# Patient Record
Sex: FEMALE | Race: WHITE | NOT HISPANIC OR LATINO
[De-identification: names, ages, dates, MRNs, and addresses within clinical notes are randomized per-mention and may not be internally consistent; named-entity substitution may affect disease eponyms.]

---

## 2017-02-14 ENCOUNTER — RESULT REVIEW (OUTPATIENT)
Age: 32
End: 2017-02-14

## 2017-04-16 ENCOUNTER — RESULT REVIEW (OUTPATIENT)
Age: 32
End: 2017-04-16

## 2017-04-17 ENCOUNTER — APPOINTMENT (OUTPATIENT)
Dept: HEMATOLOGY ONCOLOGY | Facility: CLINIC | Age: 32
End: 2017-04-17

## 2017-04-17 ENCOUNTER — OUTPATIENT (OUTPATIENT)
Dept: OUTPATIENT SERVICES | Facility: HOSPITAL | Age: 32
LOS: 1 days | Discharge: HOME | End: 2017-04-17

## 2017-04-17 ENCOUNTER — LABORATORY RESULT (OUTPATIENT)
Age: 32
End: 2017-04-17

## 2017-04-17 ENCOUNTER — OUTPATIENT (OUTPATIENT)
Dept: OUTPATIENT SERVICES | Facility: HOSPITAL | Age: 32
LOS: 1 days | End: 2017-04-17
Payer: COMMERCIAL

## 2017-04-17 DIAGNOSIS — D47.3 ESSENTIAL (HEMORRHAGIC) THROMBOCYTHEMIA: ICD-10-CM

## 2017-04-17 DIAGNOSIS — D64.9 ANEMIA, UNSPECIFIED: ICD-10-CM

## 2017-04-17 LAB
HCT VFR BLD CALC: 37.3 % — SIGNIFICANT CHANGE UP (ref 34.5–45)
HGB BLD-MCNC: 12.4 G/DL — SIGNIFICANT CHANGE UP (ref 11.5–15.5)
MCHC RBC-ENTMCNC: 28 PG — SIGNIFICANT CHANGE UP (ref 27–34)
MCHC RBC-ENTMCNC: 33.3 G/DL — SIGNIFICANT CHANGE UP (ref 32–36)
MCV RBC AUTO: 84.2 FL — SIGNIFICANT CHANGE UP (ref 80–100)
PLATELET # BLD AUTO: 394 K/UL — SIGNIFICANT CHANGE UP (ref 150–400)
RBC # BLD: 4.43 M/UL — SIGNIFICANT CHANGE UP (ref 3.8–5.2)
RBC # FLD: 12.7 % — SIGNIFICANT CHANGE UP (ref 10.3–14.5)
WBC # BLD: 8 K/UL — SIGNIFICANT CHANGE UP (ref 3.8–10.5)
WBC # FLD AUTO: 8 K/UL — SIGNIFICANT CHANGE UP (ref 3.8–10.5)

## 2017-04-17 PROCEDURE — 81270 JAK2 GENE: CPT

## 2017-04-17 PROCEDURE — G0452: CPT | Mod: 26

## 2017-04-18 DIAGNOSIS — D47.3 ESSENTIAL (HEMORRHAGIC) THROMBOCYTHEMIA: ICD-10-CM

## 2017-04-19 ENCOUNTER — TRANSCRIPTION ENCOUNTER (OUTPATIENT)
Age: 32
End: 2017-04-19

## 2017-04-19 LAB — JAK2 P.V617F BLD/T QL: SIGNIFICANT CHANGE UP

## 2018-01-18 ENCOUNTER — RESULT REVIEW (OUTPATIENT)
Age: 33
End: 2018-01-18

## 2018-04-09 ENCOUNTER — APPOINTMENT (OUTPATIENT)
Dept: HUMAN REPRODUCTION | Facility: CLINIC | Age: 33
End: 2018-04-09
Payer: COMMERCIAL

## 2018-04-09 PROCEDURE — 99245 OFF/OP CONSLTJ NEW/EST HI 55: CPT | Mod: 25

## 2018-04-09 PROCEDURE — 76830 TRANSVAGINAL US NON-OB: CPT

## 2018-04-09 PROCEDURE — 36415 COLL VENOUS BLD VENIPUNCTURE: CPT

## 2018-04-30 ENCOUNTER — APPOINTMENT (OUTPATIENT)
Dept: HUMAN REPRODUCTION | Facility: CLINIC | Age: 33
End: 2018-04-30
Payer: COMMERCIAL

## 2018-04-30 PROCEDURE — 36415 COLL VENOUS BLD VENIPUNCTURE: CPT

## 2018-04-30 PROCEDURE — 76830 TRANSVAGINAL US NON-OB: CPT

## 2018-04-30 PROCEDURE — 99213 OFFICE O/P EST LOW 20 MIN: CPT | Mod: 25

## 2019-06-26 ENCOUNTER — TRANSCRIPTION ENCOUNTER (OUTPATIENT)
Age: 34
End: 2019-06-26

## 2019-07-02 ENCOUNTER — EMERGENCY (EMERGENCY)
Facility: HOSPITAL | Age: 34
LOS: 1 days | Discharge: HOME | End: 2019-07-02
Attending: EMERGENCY MEDICINE
Payer: COMMERCIAL

## 2019-07-02 VITALS
OXYGEN SATURATION: 100 % | WEIGHT: 227.96 LBS | HEART RATE: 79 BPM | RESPIRATION RATE: 20 BRPM | DIASTOLIC BLOOD PRESSURE: 77 MMHG | HEIGHT: 68 IN | SYSTOLIC BLOOD PRESSURE: 116 MMHG | TEMPERATURE: 98 F

## 2019-07-02 DIAGNOSIS — R10.9 UNSPECIFIED ABDOMINAL PAIN: ICD-10-CM

## 2019-07-02 LAB
ALBUMIN SERPL ELPH-MCNC: 4.1 G/DL — SIGNIFICANT CHANGE UP (ref 3.3–5)
ALP SERPL-CCNC: 75 U/L — SIGNIFICANT CHANGE UP (ref 40–120)
ALT FLD-CCNC: 18 U/L — SIGNIFICANT CHANGE UP (ref 10–45)
ANION GAP SERPL CALC-SCNC: 15 MMOL/L — SIGNIFICANT CHANGE UP (ref 5–17)
APPEARANCE UR: ABNORMAL
AST SERPL-CCNC: 16 U/L — SIGNIFICANT CHANGE UP (ref 10–40)
BACTERIA # UR AUTO: NEGATIVE — SIGNIFICANT CHANGE UP
BASOPHILS # BLD AUTO: 0.1 K/UL — SIGNIFICANT CHANGE UP (ref 0–0.2)
BASOPHILS NFR BLD AUTO: 0.6 % — SIGNIFICANT CHANGE UP (ref 0–2)
BILIRUB SERPL-MCNC: 0.5 MG/DL — SIGNIFICANT CHANGE UP (ref 0.2–1.2)
BILIRUB UR-MCNC: NEGATIVE — SIGNIFICANT CHANGE UP
BLD GP AB SCN SERPL QL: NEGATIVE — SIGNIFICANT CHANGE UP
BUN SERPL-MCNC: 10 MG/DL — SIGNIFICANT CHANGE UP (ref 7–23)
CALCIUM SERPL-MCNC: 9.6 MG/DL — SIGNIFICANT CHANGE UP (ref 8.4–10.5)
CHLORIDE SERPL-SCNC: 101 MMOL/L — SIGNIFICANT CHANGE UP (ref 96–108)
CO2 SERPL-SCNC: 21 MMOL/L — LOW (ref 22–31)
COLOR SPEC: YELLOW — SIGNIFICANT CHANGE UP
CREAT SERPL-MCNC: 0.73 MG/DL — SIGNIFICANT CHANGE UP (ref 0.5–1.3)
DIFF PNL FLD: ABNORMAL
EOSINOPHIL # BLD AUTO: 0.1 K/UL — SIGNIFICANT CHANGE UP (ref 0–0.5)
EOSINOPHIL NFR BLD AUTO: 1.4 % — SIGNIFICANT CHANGE UP (ref 0–6)
EPI CELLS # UR: 9 /HPF — HIGH
GLUCOSE SERPL-MCNC: 89 MG/DL — SIGNIFICANT CHANGE UP (ref 70–99)
GLUCOSE UR QL: NEGATIVE — SIGNIFICANT CHANGE UP
HCG SERPL-ACNC: 216.5 MIU/ML — HIGH
HCT VFR BLD CALC: 38.7 % — SIGNIFICANT CHANGE UP (ref 34.5–45)
HGB BLD-MCNC: 13.4 G/DL — SIGNIFICANT CHANGE UP (ref 11.5–15.5)
HYALINE CASTS # UR AUTO: 0 /LPF — SIGNIFICANT CHANGE UP (ref 0–7)
KETONES UR-MCNC: ABNORMAL
LEUKOCYTE ESTERASE UR-ACNC: NEGATIVE — SIGNIFICANT CHANGE UP
LYMPHOCYTES # BLD AUTO: 2.9 K/UL — SIGNIFICANT CHANGE UP (ref 1–3.3)
LYMPHOCYTES # BLD AUTO: 30.9 % — SIGNIFICANT CHANGE UP (ref 13–44)
MCHC RBC-ENTMCNC: 29.6 PG — SIGNIFICANT CHANGE UP (ref 27–34)
MCHC RBC-ENTMCNC: 34.7 GM/DL — SIGNIFICANT CHANGE UP (ref 32–36)
MCV RBC AUTO: 85.2 FL — SIGNIFICANT CHANGE UP (ref 80–100)
MONOCYTES # BLD AUTO: 0.9 K/UL — SIGNIFICANT CHANGE UP (ref 0–0.9)
MONOCYTES NFR BLD AUTO: 9.3 % — SIGNIFICANT CHANGE UP (ref 2–14)
NEUTROPHILS # BLD AUTO: 5.4 K/UL — SIGNIFICANT CHANGE UP (ref 1.8–7.4)
NEUTROPHILS NFR BLD AUTO: 57.8 % — SIGNIFICANT CHANGE UP (ref 43–77)
NITRITE UR-MCNC: NEGATIVE — SIGNIFICANT CHANGE UP
PH UR: 6 — SIGNIFICANT CHANGE UP (ref 5–8)
PLATELET # BLD AUTO: 392 K/UL — SIGNIFICANT CHANGE UP (ref 150–400)
POTASSIUM SERPL-MCNC: 4.2 MMOL/L — SIGNIFICANT CHANGE UP (ref 3.5–5.3)
POTASSIUM SERPL-SCNC: 4.2 MMOL/L — SIGNIFICANT CHANGE UP (ref 3.5–5.3)
PROT SERPL-MCNC: 7.4 G/DL — SIGNIFICANT CHANGE UP (ref 6–8.3)
PROT UR-MCNC: ABNORMAL
RBC # BLD: 4.55 M/UL — SIGNIFICANT CHANGE UP (ref 3.8–5.2)
RBC # FLD: 12.3 % — SIGNIFICANT CHANGE UP (ref 10.3–14.5)
RBC CASTS # UR COMP ASSIST: 2088 /HPF — HIGH (ref 0–4)
RH IG SCN BLD-IMP: POSITIVE — SIGNIFICANT CHANGE UP
SODIUM SERPL-SCNC: 137 MMOL/L — SIGNIFICANT CHANGE UP (ref 135–145)
SP GR SPEC: 1.02 — SIGNIFICANT CHANGE UP (ref 1.01–1.02)
UROBILINOGEN FLD QL: NEGATIVE — SIGNIFICANT CHANGE UP
WBC # BLD: 9.4 K/UL — SIGNIFICANT CHANGE UP (ref 3.8–10.5)
WBC # FLD AUTO: 9.4 K/UL — SIGNIFICANT CHANGE UP (ref 3.8–10.5)
WBC UR QL: 4 /HPF — SIGNIFICANT CHANGE UP (ref 0–5)

## 2019-07-02 PROCEDURE — 76817 TRANSVAGINAL US OBSTETRIC: CPT | Mod: 26

## 2019-07-02 PROCEDURE — 99218: CPT

## 2019-07-02 PROCEDURE — 93975 VASCULAR STUDY: CPT | Mod: 26

## 2019-07-02 RX ORDER — SODIUM CHLORIDE 9 MG/ML
3 INJECTION INTRAMUSCULAR; INTRAVENOUS; SUBCUTANEOUS EVERY 8 HOURS
Refills: 0 | Status: DISCONTINUED | OUTPATIENT
Start: 2019-07-02 | End: 2019-07-06

## 2019-07-02 RX ORDER — ACETAMINOPHEN 500 MG
650 TABLET ORAL ONCE
Refills: 0 | Status: COMPLETED | OUTPATIENT
Start: 2019-07-02 | End: 2019-07-02

## 2019-07-02 RX ORDER — SODIUM CHLORIDE 9 MG/ML
1000 INJECTION, SOLUTION INTRAVENOUS
Refills: 0 | Status: DISCONTINUED | OUTPATIENT
Start: 2019-07-02 | End: 2019-07-06

## 2019-07-02 RX ORDER — FAMOTIDINE 10 MG/ML
20 INJECTION INTRAVENOUS DAILY
Refills: 0 | Status: DISCONTINUED | OUTPATIENT
Start: 2019-07-02 | End: 2019-07-06

## 2019-07-02 RX ORDER — KETOROLAC TROMETHAMINE 30 MG/ML
15 SYRINGE (ML) INJECTION ONCE
Refills: 0 | Status: DISCONTINUED | OUTPATIENT
Start: 2019-07-02 | End: 2019-07-02

## 2019-07-02 RX ADMIN — Medication 15 MILLIGRAM(S): at 21:32

## 2019-07-02 RX ADMIN — FAMOTIDINE 20 MILLIGRAM(S): 10 INJECTION INTRAVENOUS at 18:45

## 2019-07-02 RX ADMIN — Medication 650 MILLIGRAM(S): at 16:58

## 2019-07-02 RX ADMIN — SODIUM CHLORIDE 3 MILLILITER(S): 9 INJECTION INTRAMUSCULAR; INTRAVENOUS; SUBCUTANEOUS at 22:53

## 2019-07-02 RX ADMIN — SODIUM CHLORIDE 100 MILLILITER(S): 9 INJECTION, SOLUTION INTRAVENOUS at 20:56

## 2019-07-02 RX ADMIN — Medication 15 MILLIGRAM(S): at 20:56

## 2019-07-02 NOTE — ED PROVIDER NOTE - PROGRESS NOTE DETAILS
gyn evaluated pt review ultrasound would like to place in obs for repeat h/h and re-eval in the am; tp currently stable

## 2019-07-02 NOTE — ED PROVIDER NOTE - CLINICAL SUMMARY MEDICAL DECISION MAKING FREE TEXT BOX
30F, multiple miscarriages and ectopics, p.w LLQ pain and vaginal bld today after methotrexate last night. VSS, no fever, dysuria, hematuria. concerning for ruptured ectopic, will get labs, type and screeen, tvus, and basic labs. symptoms can be side effects of methotrexate.

## 2019-07-02 NOTE — ED CDU PROVIDER INITIAL DAY NOTE - OBJECTIVE STATEMENT
34F,  (2 ectop, 3miscarriage), h.o right salpingectomy, ovarian cyst, p.w LLQ and epigastric pain and vaginal spotting (1pad today) this AM s/p methotrexate last night for ectopic pregn, LMS 19. Pt states pain is sharp and radiates to the back. GYN Caffaro. Patient was being followed in the office for pregnancy of unknown location and had the following b-hcg trend: 102 ()->169 ()->118 ()-> 203 ()->321 (). No IUP on sono and no adnexal masses. She reports no other complaints since receiving the MTX.  In ED, patient had H/H 13.4/38.7, Beta .5 and US transvaginal showing Left 1.4 cm adnexal ectopic pregnancy.  Small hemoperitoneum. Patient evaluated by GYN and sent to CDU for frequent reeval, repeat labs (H/H, beta HCG) and pain control 34F,  (2 ectop, 3miscarriage), h.o right salpingectomy, ovarian cyst, p.w LLQ and epigastric pain and vaginal spotting (1pad today) this AM s/p methotrexate last night for ectopic pregn, LMS 19. Pt states pain is sharp and radiates to the back. GYN Caffaro. Patient was being followed in the office for pregnancy of unknown location and had the following b-hcg trend: 102 ()->169 ()->118 ()-> 203 ()->321 (). No IUP on sono and no adnexal masses. She reports no other complaints since receiving the MTX.  In ED, patient had H/H 13.4/38.7, Beta .5 and US transvaginal showing Left 1.4 cm adnexal ectopic pregnancy. Small hemoperitoneum. Patient evaluated by GYN and sent to CDU for frequent reeval, repeat labs (H/H, beta HCG) and pain control.

## 2019-07-02 NOTE — ED ADULT NURSE NOTE - NSIMPLEMENTINTERV_GEN_ALL_ED
Implemented All Universal Safety Interventions:  [place] to call system. Call bell, personal items and telephone within reach. Instruct patient to call for assistance. Room bathroom lighting operational. Non-slip footwear when patient is off stretcher. Physically safe environment: no spills, clutter or unnecessary equipment. Stretcher in lowest position, wheels locked, appropriate side rails in place.

## 2019-07-02 NOTE — CONSULT NOTE ADULT - SUBJECTIVE AND OBJECTIVE BOX
GYNECOLOGIC CONSULT NOTE    34y  LMP , status post MTX 19 for suspected ectopic pregnancy, presents to ED c/o abdominal pain x1 day. Pain is sharp, located in the mid-line, radiates to the back. No alleviating or exacerbating factors. Denies associated n/v. Patient was being followed in the office for pregnancy of unknown location and had the following b-hcg trend: 102 ()->169 ()->118 ()-> 203 ()->321 (). No IUP on sono and no adnexal masses. She reports no other complaints since receiving the MTX. Denies fever/chills, hives/pruritus, vaginal bleeding, pelvic pain.     GYN Physician: Dr. Gardner    OB/GYN History: 1x  2016, 2x ectopic pregnancies status post MTX, SAB x2, h/o ovarian cysts status post laparoscopic cystectomy x2 (left and right ovaries), blocked fallopian tube status post laparoscopic salpingectomy; denies fibroids    Surgical History:    H/o multiple laparoscopies  Right salpingectomy  Ovarian cystectomy x1  HSG    Past Medical History:   Migraines     Meds:   Propanolol     All:   NKDA    Family History:   No significant history    Social History:   Denies alcohol/drug/tobacco use    REVIEW OF SYSTEMS:    CONSTITUTIONAL: No fever, weight loss, or fatigue  RESPIRATORY: No cough, wheezing, chills or hemoptysis; No shortness of breath  CARDIOVASCULAR: No chest pain, palpitations, dizziness  GASTROINTESTINAL: +abdominal pain; No nausea, vomiting; No diarrhea or constipation.  GENITOURINARY: No dysuria, frequency, hematuria, or incontinence  MUSCULOSKELETAL: No joint pain or swelling  PELVIS: No vaginal bleeding    MEDICATIONS  (STANDING):  famotidine    Tablet 20 milliGRAM(s) Oral daily    MEDICATIONS  (PRN):    OBJECTIVE FINDINGS:    Vital Signs Last 24 Hrs  T(C): 37.2 (2019 14:30), Max: 37.2 (2019 14:30)  T(F): 99 (2019 14:30), Max: 99 (2019 14:30)  HR: 81 (2019 14:30) (79 - 81)  BP: 118/78 (2019 14:30) (116/77 - 118/78)  BP(mean): --  RR: 18 (2019 14:30) (18 - 20)  SpO2: 100% (2019 14:30) (100% - 100%)    PHYSICAL EXAM:    GENERAL: NAD  NERVOUS SYSTEM:  Alert & Oriented X3, Good concentration  CHEST/LUNG: CTAB  HEART: Regular rate and rhythm  ABDOMEN: Soft, Nontender, Nondistended; No rebound, No guarding  PELVIC: Deferred    LABS:  Pending    RADIOLOGY & ADDITIONAL STUDIES:  Pending

## 2019-07-02 NOTE — CONSULT NOTE ADULT - ATTENDING COMMENTS
PT EVALUATED EARLIER TODAY & AGAIN AFTER TESTING. ABD TOTALLY BENIGN. H/H=13.4/38.7. HCG=216 [ 7/1 BEFORE MTX]. LEFT ECTOPIC 1.4X0.9 CM. SMALL AMT HEMOPERITONEUM. PT APPEARS TO BE ABORTING ECTOPIC. LOOKED AT TVS & SPOKE WITH RADIOLOGY. NOT RUPTURED. WILL WATCH OVERNIGHT. RPT CBC IN AM.WITH HCG. IF STABLE WILL D/C IN AM & FOLLOW CONSERVATIVELY. THIS WAS DISCUSSED AT LENGTH WITH PT &

## 2019-07-02 NOTE — CONSULT NOTE ADULT - ASSESSMENT
34y  LMP , status post MTX 19 for suspected ectopic pregnancy, presents w/ abdominal pain x1 day. Physical exam benign. VS stable. Low suspicion for ruptured ectopic. Suspect abdominal pain is likely 2/2 MTX administration, which is a possible side effect of the medication (causes inflammation of GI tract).

## 2019-07-02 NOTE — ED CDU PROVIDER INITIAL DAY NOTE - PROGRESS NOTE DETAILS
CDU PROGRESS NOTE PA JUANITO: Pt c/o 5/10 discomfort LLQ. Abdominal exam +ttp LLQ, + BS x4 with soft, nondistended, abdomen. Will order Toradol 15mg IVP and continue to monitor overnight. CDU PROGRESS NOTE PA JUANITO: Pt resting comfortably, without complaint. NAD, VSS. Will continue to monitor. CDU PROGRESS NOTE PA JUANITO: Pt c/o 5/10 discomfort LLQ and lower back. Abdominal exam +ttp LLQ, + BS x4, soft, no guarding, CVAT or rebound. Will order Toradol 15mg IVP and continue to monitor overnight.

## 2019-07-02 NOTE — ED ADULT NURSE NOTE - CHPI ED NUR SYMPTOMS NEG
no burning urination/no chills/no dysuria/no fever/no diarrhea/no abdominal distension/no blood in stool/no hematuria/no nausea/no vomiting

## 2019-07-02 NOTE — ED CDU PROVIDER INITIAL DAY NOTE - ATTENDING CONTRIBUTION TO CARE
I, Shannan Garcia, performed the initial face to face bedside interview with this patient regarding history of present illness, review of symptoms and relevant past medical, social and family history.  I completed an independent physical examination.  I was the initial provider who evaluated this patient. I have signed out the follow up of any pending tests (i.e. labs, radiological studies) to the ACP.  I have communicated the patient’s plan of care and disposition with the ACP.

## 2019-07-02 NOTE — ED CDU PROVIDER INITIAL DAY NOTE - MEDICAL DECISION MAKING DETAILS
pt sp methotrexate pw abd pain; + left adnexal ectopic with mild free fluid in abd--gyn would like pt in obs for h.h check and reeval in the am; pt appears stable at this time

## 2019-07-02 NOTE — ED ADULT TRIAGE NOTE - CHIEF COMPLAINT QUOTE
pt c/o LLQ pain that radiates to the back s/p taking methotrexate for ectopic pregnancy yesterday.  pt denies any bleeding at this time.

## 2019-07-02 NOTE — ED CDU PROVIDER INITIAL DAY NOTE - DETAILS
ABDOMINAL PAIN  -PAIN CONTROL  -SERIAL ABDOMINAL EXAMS  -JACKI MOREL, REPEAT LABS  -CASE D/W ATTENDING HENRIQUE

## 2019-07-02 NOTE — ED CDU PROVIDER INITIAL DAY NOTE - PHYSICAL EXAMINATION
General: NAD, good hygiene, well developed  HENT: Atraumatic, EMOI, no conjunctivae injection, moist mucosa, no post. oropharynx erythema, exudates  Neck: normal ROM and trachea midline   Cardiovascular: RRR, S1&2, no M or R, radial pulses equal and b/l  Respiratory: CTABL, no wheezes or crackles, no decreased breath sounds  Abdominal: soft and epigastric tenderness, distended, neg for guarding, joy's sign, rovsing's sign, mcburney's sign, no CVA tenderness   Extremities: no edema of the legs/feet, DP/PT equal b/l  Skin: warm, well perfused  Neurologic: nonfocal, AAOx3  Psych: normal mood and affect General: NAD, good hygiene, well developed  HENT: Atraumatic, EMOI, no conjunctivae injection, moist mucosa, no post. oropharynx erythema, exudates  Neck: normal ROM and trachea midline   Cardiovascular: RRR, S1&2, no M or R, radial pulses equal and b/l  Respiratory: CTABL, no wheezes or crackles, no decreased breath sounds  Abdominal: +bowel sounds x 4, soft, +LLQ ttp neg for guarding, joy's sign, rovsing's sign, mcburney's sign, no CVA tenderness   Extremities: no edema of the legs/feet, DP/PT equal b/l  Skin: warm, well perfused  Neurologic: nonfocal, AAOx3  Psych: normal mood and affect

## 2019-07-02 NOTE — ED PROVIDER NOTE - PHYSICAL EXAMINATION
General: NAD, good hygiene, well developed  HENT: Atraumatic, EMOI, no conjunctivae injection, moist mucosa, no post. oropharynx erythema, exudates  Neck: normal ROM and trachea midline   Cardiovascular: RRR, S1&2, no M or R, radial pulses equal and b/l  Respiratory: CTABL, no wheezes or crackles, no decreased breath sounds  Abdominal: soft and epigastric tenderness, distended, neg for guarding, joy's sign, rovsing's sign, mcburney's sign, no CVA tenderness   Extremities: no edema of the legs/feet, DP/PT equal b/l  Skin: warm, well perfused  Neurologic: nonfocal, AAOx3  Psych: normal mood and affect

## 2019-07-02 NOTE — ED CDU PROVIDER INITIAL DAY NOTE - NS ED ROS FT
GENERAL: No fever or chills, weight changes, night sweats  EYES: no change in vision  HEENT: no dysplasia, odynophagia, ear pain, rhinorrhea, epistasis   CARDIAC: no chest pain, palpitation   PULMONARY: no cough or SOB  GI: (+) LLQ and epigastic abdominal pain, no nausea or no vomiting, no diarrhea or constipation  : No changes in urination for pain/freq.   SKIN: no rashes   NEURO: no headache, numbness/tingling, extremity weakness   MSK: No joint pain GENERAL: No fever or chills, weight changes, night sweats  EYES: no change in vision  HEENT: no dysplasia, odynophagia, ear pain, rhinorrhea, epistasis   CARDIAC: no chest pain, palpitation   PULMONARY: no cough or SOB  GI: (+) LLQ and lower back, no nausea or no vomiting, no diarrhea or constipation  : No changes in urination for pain/freq.   SKIN: no rashes   NEURO: no headache, numbness/tingling, extremity weakness   MSK: No joint pain

## 2019-07-02 NOTE — ED PROVIDER NOTE - OBJECTIVE STATEMENT
34F,  (2 ectop, 3miscarriage), h.o right salpingectomy, ovarian cyst, p.w LLQ and epigastric pain and vaginal spotting (1pad today) this AM s/p methotrexate last night for ectopic pregn, LMS 19. Pt was evaluated at U/S without IUP with elevated HCG past discriminatory level. Pt states pain is sharp and radiates to the back. GYN Caffaro. No h.o of STI/PID.  Denied fever, chill, diarrhea, vomiting, hematuria or dysuria.

## 2019-07-02 NOTE — ED ADULT NURSE NOTE - OBJECTIVE STATEMENT
33 y/o female  hx ectopic pregnancy x 2, miscarriage x 3, ovarian cyst presents to the ED from home c/o LLQ abd pain x 2 days. Pt states was 8 weeks pregnant, diagnosed with ectopic pregnancy, given methotrexate last night still having cramping in LLQ radiating to flank, vaginal bleeding, using 1 pad a day. Denies fever, chills, n/v, weakness, diarrhea/constipation, numbness/tingling, urinary s/s. Pt A&Ox3, in no respiratory distress, no CP, abd soft, tender to palpation, nondistended. PT safety maintained with family at bedside, call bell within reach and bed in the lowest position.

## 2019-07-02 NOTE — ED PROVIDER NOTE - ATTENDING CONTRIBUTION TO CARE
Dr. Garcia : I have personally seen and examined this patient at the bedside. I have fully participated in the care of this patient. I have reviewed all pertinent clinical information, including history, physical exam, plan and the Resident's note and agree except as noted.     34F,  (2 ectop, 3miscarriage), h.o right salpingectomy, ovarian cyst, cholecystectomy p.w LLQ and epigastric pain and vaginal spotting (1pad today) since this morning pt is s/p methotrexate last night for ectopic pregn. , LMP 19. notes no lower abd pain at this time all epigastric now. +nausea. spotting in the morning now bleeding brbpv.    Denies f/c//v/cp/sob/palpitations/cough//d/c/dysuria/hematuria. no sick contacts/recent travel.    PE:  head; atraumatic normocephalic  eyes: perrla  Heart: rrr s1s2  lungs: ctab  abd: soft, mild epigastric discomfort neg murphys neg mc burneys nd + bs no rebound/guarding no cva ttp  le: no swelling no calf ttp  back: no midline cervical/thoracic/lumbar ttp    -->gastritis vs pancreatitis vs side effects from methotrexate will fu labs; sono; gyn consulted

## 2019-07-02 NOTE — CONSULT NOTE ADULT - PROBLEM SELECTOR RECOMMENDATION 9
-F/u labs (b-hcg, CBC, CMP, lipase, T&S)  -F/u repeat TVUS   -Monitor VS   -Final reccs pending lab and imaging results     Patient seen and examined w/ Dr. Kendra Woods PGY-2

## 2019-07-02 NOTE — ED CLERICAL - NS ED CLERK NOTE PRE-ARRIVAL INFORMATION; ADDITIONAL PRE-ARRIVAL INFORMATION
CC/Reason For referral: sent to r/o ruptured ectopic  Preferred Consultant(if applicable): gyn  Who admits for you (if needed):  Do you have documents you would like to fax over?no  Would you still like to speak to an ED attending?no

## 2019-07-03 VITALS
TEMPERATURE: 98 F | RESPIRATION RATE: 18 BRPM | SYSTOLIC BLOOD PRESSURE: 117 MMHG | OXYGEN SATURATION: 98 % | HEART RATE: 72 BPM | DIASTOLIC BLOOD PRESSURE: 78 MMHG

## 2019-07-03 LAB
BASOPHILS # BLD AUTO: 0 K/UL — SIGNIFICANT CHANGE UP (ref 0–0.2)
BASOPHILS NFR BLD AUTO: 0.6 % — SIGNIFICANT CHANGE UP (ref 0–2)
EOSINOPHIL # BLD AUTO: 0.2 K/UL — SIGNIFICANT CHANGE UP (ref 0–0.5)
EOSINOPHIL NFR BLD AUTO: 2.7 % — SIGNIFICANT CHANGE UP (ref 0–6)
HCG SERPL-ACNC: 122.7 MIU/ML — HIGH
HCT VFR BLD CALC: 36.3 % — SIGNIFICANT CHANGE UP (ref 34.5–45)
HGB BLD-MCNC: 12.7 G/DL — SIGNIFICANT CHANGE UP (ref 11.5–15.5)
LYMPHOCYTES # BLD AUTO: 3.1 K/UL — SIGNIFICANT CHANGE UP (ref 1–3.3)
LYMPHOCYTES # BLD AUTO: 42.5 % — SIGNIFICANT CHANGE UP (ref 13–44)
MCHC RBC-ENTMCNC: 29.6 PG — SIGNIFICANT CHANGE UP (ref 27–34)
MCHC RBC-ENTMCNC: 35 GM/DL — SIGNIFICANT CHANGE UP (ref 32–36)
MCV RBC AUTO: 84.5 FL — SIGNIFICANT CHANGE UP (ref 80–100)
MONOCYTES # BLD AUTO: 0.8 K/UL — SIGNIFICANT CHANGE UP (ref 0–0.9)
MONOCYTES NFR BLD AUTO: 10.2 % — SIGNIFICANT CHANGE UP (ref 2–14)
NEUTROPHILS # BLD AUTO: 3.2 K/UL — SIGNIFICANT CHANGE UP (ref 1.8–7.4)
NEUTROPHILS NFR BLD AUTO: 43.9 % — SIGNIFICANT CHANGE UP (ref 43–77)
PLATELET # BLD AUTO: 362 K/UL — SIGNIFICANT CHANGE UP (ref 150–400)
RBC # BLD: 4.3 M/UL — SIGNIFICANT CHANGE UP (ref 3.8–5.2)
RBC # FLD: 12 % — SIGNIFICANT CHANGE UP (ref 10.3–14.5)
WBC # BLD: 7.3 K/UL — SIGNIFICANT CHANGE UP (ref 3.8–10.5)
WBC # FLD AUTO: 7.3 K/UL — SIGNIFICANT CHANGE UP (ref 3.8–10.5)

## 2019-07-03 PROCEDURE — 84702 CHORIONIC GONADOTROPIN TEST: CPT

## 2019-07-03 PROCEDURE — 81001 URINALYSIS AUTO W/SCOPE: CPT

## 2019-07-03 PROCEDURE — 86900 BLOOD TYPING SEROLOGIC ABO: CPT

## 2019-07-03 PROCEDURE — 99284 EMERGENCY DEPT VISIT MOD MDM: CPT | Mod: 25

## 2019-07-03 PROCEDURE — 80053 COMPREHEN METABOLIC PANEL: CPT

## 2019-07-03 PROCEDURE — 86850 RBC ANTIBODY SCREEN: CPT

## 2019-07-03 PROCEDURE — G0378: CPT

## 2019-07-03 PROCEDURE — 96376 TX/PRO/DX INJ SAME DRUG ADON: CPT

## 2019-07-03 PROCEDURE — 85027 COMPLETE CBC AUTOMATED: CPT

## 2019-07-03 PROCEDURE — 83690 ASSAY OF LIPASE: CPT

## 2019-07-03 PROCEDURE — 93975 VASCULAR STUDY: CPT

## 2019-07-03 PROCEDURE — 99217: CPT

## 2019-07-03 PROCEDURE — 86901 BLOOD TYPING SEROLOGIC RH(D): CPT

## 2019-07-03 PROCEDURE — 96374 THER/PROPH/DIAG INJ IV PUSH: CPT

## 2019-07-03 PROCEDURE — 76817 TRANSVAGINAL US OBSTETRIC: CPT

## 2019-07-03 RX ORDER — KETOROLAC TROMETHAMINE 30 MG/ML
15 SYRINGE (ML) INJECTION ONCE
Refills: 0 | Status: DISCONTINUED | OUTPATIENT
Start: 2019-07-03 | End: 2019-07-03

## 2019-07-03 RX ADMIN — SODIUM CHLORIDE 3 MILLILITER(S): 9 INJECTION INTRAMUSCULAR; INTRAVENOUS; SUBCUTANEOUS at 05:54

## 2019-07-03 RX ADMIN — Medication 15 MILLIGRAM(S): at 05:53

## 2019-07-03 NOTE — ED CDU PROVIDER DISPOSITION NOTE - NSFOLLOWUPINSTRUCTIONS_ED_ALL_ED_FT
1. Stay hydrated. Rest.   2. Take Ibuprofen 600mg every 6hrs for pain as needed with food.   3. Follow up with your Ob/gyn Dr. Gardner at your appointment in 2 days. Bring Printed Results.  4. Return if symptoms worsen, fever, weakness, inability to eat/drink, dizziness, worsening pain, bleeding and all other concerns.

## 2019-07-03 NOTE — ED CDU PROVIDER SUBSEQUENT DAY NOTE - PHYSICAL EXAMINATION
General: NAD, good hygiene, well developed  HENT: Atraumatic, EMOI, no conjunctivae injection, moist mucosa, no post. oropharynx erythema, exudates  Neck: normal ROM and trachea midline   Cardiovascular: RRR, S1&2, no M or R, radial pulses equal and b/l  Respiratory: CTABL, no wheezes or crackles, no decreased breath sounds  Abdominal: soft, distended, no ttp, neg for guarding, joy's sign, rovsing's sign, mcburney's sign, no CVA tenderness   Extremities: no edema of the legs/feet, DP/PT equal b/l  Skin: warm, well perfused  Neurologic: nonfocal, AAOx3  Psych: normal mood and affect General: NAD, good hygiene, well developed  HENT: Atraumatic, EMOI, no conjunctivae injection, moist mucosa, no post. oropharynx erythema, exudates  Neck: normal ROM and trachea midline   Cardiovascular: RRR, S1&2, no M or R, radial pulses equal and b/l  Respiratory: CTABL, no wheezes or crackles, no decreased breath sounds  Abdominal: soft, + ttp LLQ, neg for guarding, joy's sign, rovsing's sign, mcburney's sign, no CVA tenderness   Extremities: no edema of the legs/feet, DP/PT equal b/l  Skin: warm, well perfused  Neurologic: nonfocal, AAOx3  Psych: normal mood and affect

## 2019-07-03 NOTE — ED ADULT NURSE REASSESSMENT NOTE - NSIMPLEMENTINTERV_GEN_ALL_ED
Implemented All Universal Safety Interventions:  [place] to call system. Call bell, personal items and telephone within reach. Instruct patient to call for assistance. Room bathroom lighting operational. Non-slip footwear when patient is off stretcher. Physically safe environment: no spills, clutter or unnecessary equipment. Stretcher in lowest position, wheels locked, appropriate side rails in place.

## 2019-07-03 NOTE — ED CDU PROVIDER DISPOSITION NOTE - CLINICAL COURSE
34F,  (2 ectop, 3miscarriage), h.o right salpingectomy, ovarian cyst, p.w LLQ and epigastric pain and vaginal spotting (1pad today) this AM s/p methotrexate last night for ectopic pregn, LMS 19. Pt states pain is sharp and radiates to the back. GYN Caffaro. Patient was being followed in the office for pregnancy of unknown location and had the following b-hcg trend: 102 ()->169 ()->118 ()-> 203 ()->321 (). No IUP on sono and no adnexal masses. She reports no other complaints since receiving the MTX.  In ED, patient had H/H 13.4/38.7, Beta .5 and US transvaginal showing Left 1.4 cm adnexal ectopic pregnancy. Small hemoperitoneum. Patient evaluated by GYN and sent to CDU for frequent reeval, repeat labs (H/H, beta HCG) and pain control. 34F,  (2 ectop, 3miscarriage), h.o right salpingectomy, ovarian cyst, p.w LLQ and epigastric pain and vaginal spotting (1pad today) this AM s/p methotrexate last night for ectopic pregn, LMS 19. Pt states pain is sharp and radiates to the back. GYN Caffaro. Patient was being followed in the office for pregnancy of unknown location and had the following b-hcg trend: 102 ()->169 ()->118 ()-> 203 ()->321 (). No IUP on sono and no adnexal masses. She reports no other complaints since receiving the MTX.  In ED, patient had H/H 13.4/38.7, Beta .5 and US transvaginal showing Left 1.4 cm adnexal ectopic pregnancy. Small hemoperitoneum. Patient evaluated by GYN and sent to CDU for frequent reeval, repeat labs (H/H, beta HCG) and pain control. pt's repeat labs show improved HCG. pt without pain by morning. pt reevaluated by OB/GYN team, ok to d/c home.

## 2019-07-03 NOTE — ED CDU PROVIDER SUBSEQUENT DAY NOTE - PROGRESS NOTE DETAILS
CDU PROGRESS NOTE PA JUANITO: Pt resting in stretcher in NAD. Patient c/o 5/10 pain to LLQ. Abdomen: soft, distended, +ttp LLQ neg for guarding, joy's sign, rovsing's sign, mcburney's sign, no CVA tenderness. Will order Toradol 15mg IV and monitor. Repeat CBC and beta HCG pending. GYN following. CDU PROGRESS NOTE PA JUANITO: Pt resting in stretcher in NAD. Patient c/o 5/10 pain to LLQ and lower back. Abdomen: soft, +ttp LLQ neg for guarding, joy's sign, rovsing's sign, mcburney's sign, no CVA tenderness. Will order Toradol 15mg IV and monitor. Repeat CBC and beta HCG pending. GYN following. CDU PROGRESS NOTE JYOTHI SOLOMON: Patient states she is feeling better and pain has subsided. Repeat Beta hcg trending down from 216.5 to 122.7, H/H stable. Patient evaluated by GYN team, and state patient stable for d/c and may follow up with her GYN, Dr. Gardner this Friday for repeat Beta Hcg. CDU NOTE JYOTHI Brennan: pt resting comfortably, feels well without complaint. no pain. NAD recent VSS. abd- soft, NT/ND. as per OB/GYN team, pt stable for d/c home. Patient re-evaluated at bedside. No acute complaints. Reports feeling much better, aware of results. States she will follow up with GYN. Seen by GYN.  PE: NAD, VSS, Abdomen soft, non-tender, non-distended, no rebound, no guarding

## 2019-07-03 NOTE — ED ADULT NURSE REASSESSMENT NOTE - GENERAL PATIENT STATE
comfortable appearance/cooperative
comfortable appearance
smiling/interactive/comfortable appearance/cooperative

## 2019-07-03 NOTE — ED CDU PROVIDER SUBSEQUENT DAY NOTE - ATTENDING CONTRIBUTION TO CARE
Ectopic pregnancy, s/p methotrexate, evaluated by GYN, rec. obs, bHCG downtrended, cleared by GYN to be discharged. Patient stable for discharge, no acute complaints,.

## 2019-07-03 NOTE — ED CDU PROVIDER DISPOSITION NOTE - PLAN OF CARE
As recommended by OB/GYN.............  Follow up with your Primary Care Physician within the next 2-3 days  Bring a copy of your test results with you to your appointment  Return to the Emergency Room if you experience new or worsening symptoms

## 2019-07-03 NOTE — PROGRESS NOTE ADULT - ASSESSMENT
A/P: 34y presenting w/ abdominal pain s/p MTX 7/1/19, likely from an aborting ectopic pregnancy through the fallopian tubes.  Patient is stable and doing well.  Morning labs have returned and were discussed with Dr. Gardner.  Patient will be seen in the office on 7/5 for day 4 bhCG follow up.  Plan discussed with the CDU team. 34y p/w abdominal pain s/p MTX 7/1/19, likely from an aborting ectopic pregnancy through the fallopian tubes.  Patient is stable and doing well, now without abdominal pain.  AM H/H stable and bHCG downtrending.

## 2019-07-03 NOTE — ED ADULT NURSE REASSESSMENT NOTE - NS ED NURSE REASSESS COMMENT FT1
10.00 Am Pt is reviewed by CDU Md Trung Murphy with all the results. pt feeling better . Pt discharged Ml out JYOTHI Brennan explained the follow up care & gave the discharge summary.  Pt verbalized the understanding on follow up care. Pt has stable vitals steady gait A&OX 4 pain free Stacie N/V/D afebrile at the time of discharge
Pt transported to US
cdu PA examining pt
Pt received from TERESO Muniz. Pt oriented to CDU & plan of care was discussed. Pt endorses 5/10 LLQ and lower back pain. To be medicated as per MAR. Pt denies any excessive bleeding at the moment and states she only notices blood when whipping. Pt aware to notify RN or PA of any increase in bleeding and severe abdominal pain. Safety & comfort measures maintained. Call bell in reach. Will continue to monitor.
07.00 Am Pt received from TERESO Randall pt is observed for  Ectopic pregnancy with abdominal pain    08.00 Am  Pt is  reassessed Pt is A&OX 4 Pt is oriented to CDU & plan of care was discussed.  Pt denies CP  SOB  N/V/D fever chills has stable vitals   Safety & comfort measures maintained. IV site looks clean & dry  no signs of infiltration noted pt denies pain @ IV site  . Pt advised to call for help if needed Call bell  with in  the  reach. Pt verbalized the understanding  Will continue to monitor.

## 2019-07-03 NOTE — PROGRESS NOTE ADULT - SUBJECTIVE AND OBJECTIVE BOX
R1 OBGYN Progress Note        Patient seen and examined at bedside.  No acute events overnight.  No acute complaints.  Pain well controlled. Patient is ambulating and tolerating PO intake. Patient is passing flatus.  Patient is voiding spontaneously.  Denies CP, SOB, N/V, fevers, and chills.    Vital Signs Last 24 Hours  T(C): 36.8 (07-03-19 @ 04:08), Max: 37.2 (07-02-19 @ 14:30)  HR: 77 (07-03-19 @ 04:08) (67 - 81)  BP: 108/70 (07-03-19 @ 04:08) (94/61 - 118/78)  RR: 18 (07-03-19 @ 04:08) (18 - 20)  SpO2: 98% (07-03-19 @ 04:08) (98% - 100%)      Physical Exam:  General: NAD  CV: RR, S1, S2  Lungs: CTA b/l, good air flow b/l   Abdomen: Soft, mildly-tender to palpation diffusely, normoactive bowel sounds  : light vaginal bleeding  Ext: No pain or swelling     Labs:                        12.7   7.3   )-----------( 362      ( 03 Jul 2019 05:46 )             36.3   baso 0.6    eos 2.7    imm gran x      lymph 42.5   mono 10.2   poly 43.9                         13.4   9.4   )-----------( 392      ( 02 Jul 2019 17:21 )             38.7   baso 0.6    eos 1.4    imm gran x      lymph 30.9   mono 9.3    poly 57.8       MEDICATIONS  (STANDING):  dextrose 5% + sodium chloride 0.45%. 1000 milliLiter(s) (100 mL/Hr) IV Continuous <Continuous>  famotidine    Tablet 20 milliGRAM(s) Oral daily  sodium chloride 0.9% lock flush 3 milliLiter(s) IV Push every 8 hours    Neuro: Pain well controlled, continue on PO pain medications  CV: hemodynamically stable, monitor vitals  Pulm: saturating well on room air, encourage oob/amb  GI: tolerating regular diet, SLIV  : voiding spontaneously, urine output adequate  Heme: SCDs for DVT ppx, f/u AM H&H  ID: afebrile  Dispo: continue routine post-op care    Talia King PGY-1 R1 OBGYN Progress Note    HD#2    SUBJECTIVE:    Patient seen and examined at bedside.  No acute events overnight.  No acute complaints.  Pt denies pain. Patient is ambulating and tolerating PO intake. Patient is passing flatus.  Patient is voiding spontaneously.  Denies CP, SOB, N/V, fevers, and chills.    OBJECTIVE:  Vital Signs Last 24 Hours  T(C): 36.8 (07-03-19 @ 04:08), Max: 37.2 (07-02-19 @ 14:30)  HR: 77 (07-03-19 @ 04:08) (67 - 81)  BP: 108/70 (07-03-19 @ 04:08) (94/61 - 118/78)  RR: 18 (07-03-19 @ 04:08) (18 - 20)  SpO2: 98% (07-03-19 @ 04:08) (98% - 100%)      Physical Exam:  General: NAD  CV: RR, S1, S2  Lungs: CTA b/l, good air flow b/l   Abdomen: Soft, nontender to palpation, normoactive bowel sounds  : light vaginal bleeding  Ext: wwp, nontender    Labs:                        12.7   7.3   )-----------( 362      ( 03 Jul 2019 05:46 )             36.3   baso 0.6    eos 2.7    imm gran x      lymph 42.5   mono 10.2   poly 43.9                         13.4   9.4   )-----------( 392      ( 02 Jul 2019 17:21 )             38.7   baso 0.6    eos 1.4    imm gran x      lymph 30.9   mono 9.3    poly 57.8       MEDICATIONS  (STANDING):  dextrose 5% + sodium chloride 0.45%. 1000 milliLiter(s) (100 mL/Hr) IV Continuous <Continuous>  famotidine    Tablet 20 milliGRAM(s) Oral daily  sodium chloride 0.9% lock flush 3 milliLiter(s) IV Push every 8 hours

## 2019-07-03 NOTE — PROGRESS NOTE ADULT - PROBLEM SELECTOR PLAN 1
Neuro: Pain well controlled, continue on PO pain medications  CV: hemodynamically stable, monitor vitals  Pulm: saturating well on room air, encourage oob/amb  GI: tolerating regular diet  : voiding spontaneously  Heme: SCDs for DVT ppx, f/u AM H&H  ID: afebrile  Dispo: d/c home w/ outpatient f/u on 7/5    aTlia King PGY-1 Neuro: Pain well controlled, continue on PO pain medications  CV: hemodynamically stable, monitor vitals  Pulm: saturating well on room air, encourage oob/amb  GI: tolerating regular diet  : voiding spontaneously  Heme: SCDs for DVT ppx, f/u AM H&H  ID: afebrile  Dispo: d/c home w/ outpatient f/u on 7/5    Talia King PGY-1    Patient seen and assessed with GYN team.  Patient with minimal pain and appropriate AM labs.  Patient stable for discharge home with precautions and close outpatient follow-up    DERRICK Dacosta PGY4 Neuro: Pt denies pain; may be discharged with tylenol/motrin for pain control  CV: hemodynamically stable, monitor vitals  Pulm: saturating well on room air, encourage oob/amb  GI: tolerating regular diet, SLIV  : voiding spontaneously; pelvic rest  Heme: SCDs for DVT ppx, AM H/H stable  ID: afebrile, no leukocytosis  Dispo: pt stable for discharge with plan to f/u w/ Dr. Gardner in office on 7/5.    Talia King PGY-1    Patient seen and assessed with GYN team.  Patient with minimal pain and appropriate AM labs.  Patient stable for discharge home with precautions and close outpatient follow-up    DERRICK Dacosta PGY4

## 2020-01-13 ENCOUNTER — RESULT REVIEW (OUTPATIENT)
Age: 35
End: 2020-01-13

## 2020-04-23 ENCOUNTER — ASOB RESULT (OUTPATIENT)
Age: 35
End: 2020-04-23

## 2020-04-23 ENCOUNTER — APPOINTMENT (OUTPATIENT)
Dept: ANTEPARTUM | Facility: CLINIC | Age: 35
End: 2020-04-23
Payer: COMMERCIAL

## 2020-04-23 PROCEDURE — 76811 OB US DETAILED SNGL FETUS: CPT

## 2020-04-27 ENCOUNTER — APPOINTMENT (OUTPATIENT)
Dept: ANTEPARTUM | Facility: CLINIC | Age: 35
End: 2020-04-27
Payer: COMMERCIAL

## 2020-04-27 ENCOUNTER — ASOB RESULT (OUTPATIENT)
Age: 35
End: 2020-04-27

## 2020-04-27 PROCEDURE — 76816 OB US FOLLOW-UP PER FETUS: CPT

## 2020-08-11 ENCOUNTER — OUTPATIENT (OUTPATIENT)
Dept: OUTPATIENT SERVICES | Facility: HOSPITAL | Age: 35
LOS: 1 days | End: 2020-08-11
Payer: COMMERCIAL

## 2020-08-11 DIAGNOSIS — O26.899 OTHER SPECIFIED PREGNANCY RELATED CONDITIONS, UNSPECIFIED TRIMESTER: ICD-10-CM

## 2020-08-11 DIAGNOSIS — Z3A.00 WEEKS OF GESTATION OF PREGNANCY NOT SPECIFIED: ICD-10-CM

## 2020-08-11 PROCEDURE — G0463: CPT

## 2020-08-11 PROCEDURE — 59025 FETAL NON-STRESS TEST: CPT

## 2020-08-11 PROCEDURE — 99214 OFFICE O/P EST MOD 30 MIN: CPT

## 2020-08-14 ENCOUNTER — OUTPATIENT (OUTPATIENT)
Dept: OUTPATIENT SERVICES | Facility: HOSPITAL | Age: 35
LOS: 1 days | End: 2020-08-14
Payer: COMMERCIAL

## 2020-08-14 DIAGNOSIS — Z3A.00 WEEKS OF GESTATION OF PREGNANCY NOT SPECIFIED: ICD-10-CM

## 2020-08-14 DIAGNOSIS — O26.899 OTHER SPECIFIED PREGNANCY RELATED CONDITIONS, UNSPECIFIED TRIMESTER: ICD-10-CM

## 2020-08-14 PROCEDURE — 59025 FETAL NON-STRESS TEST: CPT

## 2020-08-14 PROCEDURE — G0463: CPT

## 2020-08-20 ENCOUNTER — OUTPATIENT (OUTPATIENT)
Dept: OUTPATIENT SERVICES | Facility: HOSPITAL | Age: 35
LOS: 1 days | End: 2020-08-20
Payer: COMMERCIAL

## 2020-08-20 DIAGNOSIS — O26.899 OTHER SPECIFIED PREGNANCY RELATED CONDITIONS, UNSPECIFIED TRIMESTER: ICD-10-CM

## 2020-08-20 DIAGNOSIS — Z3A.00 WEEKS OF GESTATION OF PREGNANCY NOT SPECIFIED: ICD-10-CM

## 2020-08-20 PROCEDURE — G0463: CPT

## 2020-08-20 PROCEDURE — 59025 FETAL NON-STRESS TEST: CPT

## 2020-08-24 ENCOUNTER — TRANSCRIPTION ENCOUNTER (OUTPATIENT)
Age: 35
End: 2020-08-24

## 2020-08-24 ENCOUNTER — INPATIENT (INPATIENT)
Facility: HOSPITAL | Age: 35
LOS: 3 days | Discharge: HOME | End: 2020-08-28
Attending: OBSTETRICS & GYNECOLOGY | Admitting: OBSTETRICS & GYNECOLOGY
Payer: COMMERCIAL

## 2020-08-24 VITALS — WEIGHT: 246.92 LBS | HEIGHT: 68 IN

## 2020-08-24 DIAGNOSIS — O26.899 OTHER SPECIFIED PREGNANCY RELATED CONDITIONS, UNSPECIFIED TRIMESTER: ICD-10-CM

## 2020-08-24 DIAGNOSIS — Z34.80 ENCOUNTER FOR SUPERVISION OF OTHER NORMAL PREGNANCY, UNSPECIFIED TRIMESTER: ICD-10-CM

## 2020-08-24 DIAGNOSIS — Z3A.00 WEEKS OF GESTATION OF PREGNANCY NOT SPECIFIED: ICD-10-CM

## 2020-08-24 LAB
ALBUMIN SERPL ELPH-MCNC: 2.8 G/DL — LOW (ref 3.3–5)
ALP SERPL-CCNC: 167 U/L — HIGH (ref 40–120)
ALT FLD-CCNC: 15 U/L — SIGNIFICANT CHANGE UP (ref 10–45)
ANION GAP SERPL CALC-SCNC: 13 MMOL/L — SIGNIFICANT CHANGE UP (ref 5–17)
APTT BLD: 24.1 SEC — LOW (ref 27.5–35.5)
AST SERPL-CCNC: 21 U/L — SIGNIFICANT CHANGE UP (ref 10–40)
BASOPHILS # BLD AUTO: 0.04 K/UL — SIGNIFICANT CHANGE UP (ref 0–0.2)
BASOPHILS # BLD AUTO: 0.05 K/UL — SIGNIFICANT CHANGE UP (ref 0–0.2)
BASOPHILS NFR BLD AUTO: 0.2 % — SIGNIFICANT CHANGE UP (ref 0–2)
BASOPHILS NFR BLD AUTO: 0.4 % — SIGNIFICANT CHANGE UP (ref 0–2)
BILIRUB SERPL-MCNC: 0.3 MG/DL — SIGNIFICANT CHANGE UP (ref 0.2–1.2)
BLD GP AB SCN SERPL QL: NEGATIVE — SIGNIFICANT CHANGE UP
BUN SERPL-MCNC: 7 MG/DL — SIGNIFICANT CHANGE UP (ref 7–23)
CALCIUM SERPL-MCNC: 8.2 MG/DL — LOW (ref 8.4–10.5)
CHLORIDE SERPL-SCNC: 105 MMOL/L — SIGNIFICANT CHANGE UP (ref 96–108)
CO2 SERPL-SCNC: 17 MMOL/L — LOW (ref 22–31)
CREAT SERPL-MCNC: 0.62 MG/DL — SIGNIFICANT CHANGE UP (ref 0.5–1.3)
EOSINOPHIL # BLD AUTO: 0.07 K/UL — SIGNIFICANT CHANGE UP (ref 0–0.5)
EOSINOPHIL # BLD AUTO: 0.19 K/UL — SIGNIFICANT CHANGE UP (ref 0–0.5)
EOSINOPHIL NFR BLD AUTO: 0.4 % — SIGNIFICANT CHANGE UP (ref 0–6)
EOSINOPHIL NFR BLD AUTO: 1.5 % — SIGNIFICANT CHANGE UP (ref 0–6)
FIBRINOGEN PPP-MCNC: 560 MG/DL — HIGH (ref 350–510)
GLUCOSE SERPL-MCNC: 105 MG/DL — HIGH (ref 70–99)
HCT VFR BLD CALC: 30.3 % — LOW (ref 34.5–45)
HCT VFR BLD CALC: 32.8 % — LOW (ref 34.5–45)
HGB BLD-MCNC: 10.4 G/DL — LOW (ref 11.5–15.5)
HGB BLD-MCNC: 9.7 G/DL — LOW (ref 11.5–15.5)
IMM GRANULOCYTES NFR BLD AUTO: 0.7 % — SIGNIFICANT CHANGE UP (ref 0–1.5)
IMM GRANULOCYTES NFR BLD AUTO: 0.8 % — SIGNIFICANT CHANGE UP (ref 0–1.5)
INR BLD: 0.99 RATIO — SIGNIFICANT CHANGE UP (ref 0.88–1.16)
LDH SERPL L TO P-CCNC: 150 U/L — SIGNIFICANT CHANGE UP (ref 50–242)
LYMPHOCYTES # BLD AUTO: 15.8 % — SIGNIFICANT CHANGE UP (ref 13–44)
LYMPHOCYTES # BLD AUTO: 2.6 K/UL — SIGNIFICANT CHANGE UP (ref 1–3.3)
LYMPHOCYTES # BLD AUTO: 2.86 K/UL — SIGNIFICANT CHANGE UP (ref 1–3.3)
LYMPHOCYTES # BLD AUTO: 22 % — SIGNIFICANT CHANGE UP (ref 13–44)
MCHC RBC-ENTMCNC: 26.4 PG — LOW (ref 27–34)
MCHC RBC-ENTMCNC: 26.7 PG — LOW (ref 27–34)
MCHC RBC-ENTMCNC: 31.7 GM/DL — LOW (ref 32–36)
MCHC RBC-ENTMCNC: 32 GM/DL — SIGNIFICANT CHANGE UP (ref 32–36)
MCV RBC AUTO: 83.2 FL — SIGNIFICANT CHANGE UP (ref 80–100)
MCV RBC AUTO: 83.5 FL — SIGNIFICANT CHANGE UP (ref 80–100)
MONOCYTES # BLD AUTO: 1.1 K/UL — HIGH (ref 0–0.9)
MONOCYTES # BLD AUTO: 1.18 K/UL — HIGH (ref 0–0.9)
MONOCYTES NFR BLD AUTO: 7.2 % — SIGNIFICANT CHANGE UP (ref 2–14)
MONOCYTES NFR BLD AUTO: 8.5 % — SIGNIFICANT CHANGE UP (ref 2–14)
NEUTROPHILS # BLD AUTO: 12.4 K/UL — HIGH (ref 1.8–7.4)
NEUTROPHILS # BLD AUTO: 8.7 K/UL — HIGH (ref 1.8–7.4)
NEUTROPHILS NFR BLD AUTO: 66.8 % — SIGNIFICANT CHANGE UP (ref 43–77)
NEUTROPHILS NFR BLD AUTO: 75.7 % — SIGNIFICANT CHANGE UP (ref 43–77)
NRBC # BLD: 0 /100 WBCS — SIGNIFICANT CHANGE UP (ref 0–0)
NRBC # BLD: 0 /100 WBCS — SIGNIFICANT CHANGE UP (ref 0–0)
PLATELET # BLD AUTO: 260 K/UL — SIGNIFICANT CHANGE UP (ref 150–400)
PLATELET # BLD AUTO: 287 K/UL — SIGNIFICANT CHANGE UP (ref 150–400)
POTASSIUM SERPL-MCNC: 3.5 MMOL/L — SIGNIFICANT CHANGE UP (ref 3.5–5.3)
POTASSIUM SERPL-SCNC: 3.5 MMOL/L — SIGNIFICANT CHANGE UP (ref 3.5–5.3)
PROT SERPL-MCNC: 5.3 G/DL — LOW (ref 6–8.3)
PROTHROM AB SERPL-ACNC: 11.8 SEC — SIGNIFICANT CHANGE UP (ref 10.6–13.6)
RBC # BLD: 3.63 M/UL — LOW (ref 3.8–5.2)
RBC # BLD: 3.94 M/UL — SIGNIFICANT CHANGE UP (ref 3.8–5.2)
RBC # FLD: 15.3 % — HIGH (ref 10.3–14.5)
RBC # FLD: 15.4 % — HIGH (ref 10.3–14.5)
RH IG SCN BLD-IMP: POSITIVE — SIGNIFICANT CHANGE UP
SARS-COV-2 IGG SERPL QL IA: NEGATIVE — SIGNIFICANT CHANGE UP
SARS-COV-2 IGM SERPL IA-ACNC: <3.8 AU/ML — SIGNIFICANT CHANGE UP
SARS-COV-2 RNA SPEC QL NAA+PROBE: SIGNIFICANT CHANGE UP
SODIUM SERPL-SCNC: 135 MMOL/L — SIGNIFICANT CHANGE UP (ref 135–145)
T PALLIDUM AB TITR SER: NEGATIVE — SIGNIFICANT CHANGE UP
URATE SERPL-MCNC: 3.7 MG/DL — SIGNIFICANT CHANGE UP (ref 2.5–7)
WBC # BLD: 13.01 K/UL — HIGH (ref 3.8–10.5)
WBC # BLD: 16.41 K/UL — HIGH (ref 3.8–10.5)
WBC # FLD AUTO: 13.01 K/UL — HIGH (ref 3.8–10.5)
WBC # FLD AUTO: 16.41 K/UL — HIGH (ref 3.8–10.5)

## 2020-08-24 PROCEDURE — 88307 TISSUE EXAM BY PATHOLOGIST: CPT | Mod: 26

## 2020-08-24 RX ORDER — OXYCODONE HYDROCHLORIDE 5 MG/1
5 TABLET ORAL ONCE
Refills: 0 | Status: DISCONTINUED | OUTPATIENT
Start: 2020-08-24 | End: 2020-08-28

## 2020-08-24 RX ORDER — IBUPROFEN 200 MG
1 TABLET ORAL
Qty: 0 | Refills: 0 | DISCHARGE
Start: 2020-08-24

## 2020-08-24 RX ORDER — SIMETHICONE 80 MG/1
80 TABLET, CHEWABLE ORAL EVERY 4 HOURS
Refills: 0 | Status: DISCONTINUED | OUTPATIENT
Start: 2020-08-24 | End: 2020-08-28

## 2020-08-24 RX ORDER — SODIUM CHLORIDE 9 MG/ML
1000 INJECTION INTRAMUSCULAR; INTRAVENOUS; SUBCUTANEOUS
Refills: 0 | Status: DISCONTINUED | OUTPATIENT
Start: 2020-08-24 | End: 2020-08-28

## 2020-08-24 RX ORDER — AER TRAVELER 0.5 G/1
1 SOLUTION RECTAL; TOPICAL EVERY 4 HOURS
Refills: 0 | Status: DISCONTINUED | OUTPATIENT
Start: 2020-08-24 | End: 2020-08-28

## 2020-08-24 RX ORDER — BENZOCAINE 10 %
1 GEL (GRAM) MUCOUS MEMBRANE EVERY 6 HOURS
Refills: 0 | Status: DISCONTINUED | OUTPATIENT
Start: 2020-08-24 | End: 2020-08-28

## 2020-08-24 RX ORDER — OXYTOCIN 10 UNIT/ML
333.33 VIAL (ML) INJECTION
Qty: 20 | Refills: 0 | Status: DISCONTINUED | OUTPATIENT
Start: 2020-08-24 | End: 2020-08-28

## 2020-08-24 RX ORDER — OXYTOCIN 10 UNIT/ML
10 VIAL (ML) INJECTION ONCE
Refills: 0 | Status: COMPLETED | OUTPATIENT
Start: 2020-08-24 | End: 2020-08-24

## 2020-08-24 RX ORDER — AMPICILLIN TRIHYDRATE 250 MG
1 CAPSULE ORAL EVERY 4 HOURS
Refills: 0 | Status: DISCONTINUED | OUTPATIENT
Start: 2020-08-24 | End: 2020-08-24

## 2020-08-24 RX ORDER — SODIUM CHLORIDE 9 MG/ML
1000 INJECTION, SOLUTION INTRAVENOUS
Refills: 0 | Status: DISCONTINUED | OUTPATIENT
Start: 2020-08-24 | End: 2020-08-24

## 2020-08-24 RX ORDER — TETANUS TOXOID, REDUCED DIPHTHERIA TOXOID AND ACELLULAR PERTUSSIS VACCINE, ADSORBED 5; 2.5; 8; 8; 2.5 [IU]/.5ML; [IU]/.5ML; UG/.5ML; UG/.5ML; UG/.5ML
0.5 SUSPENSION INTRAMUSCULAR ONCE
Refills: 0 | Status: DISCONTINUED | OUTPATIENT
Start: 2020-08-24 | End: 2020-08-28

## 2020-08-24 RX ORDER — IBUPROFEN 200 MG
600 TABLET ORAL EVERY 6 HOURS
Refills: 0 | Status: COMPLETED | OUTPATIENT
Start: 2020-08-24 | End: 2021-07-23

## 2020-08-24 RX ORDER — KETOROLAC TROMETHAMINE 30 MG/ML
30 SYRINGE (ML) INJECTION ONCE
Refills: 0 | Status: DISCONTINUED | OUTPATIENT
Start: 2020-08-24 | End: 2020-08-24

## 2020-08-24 RX ORDER — ONDANSETRON 8 MG/1
4 TABLET, FILM COATED ORAL ONCE
Refills: 0 | Status: COMPLETED | OUTPATIENT
Start: 2020-08-24 | End: 2020-08-24

## 2020-08-24 RX ORDER — TRANEXAMIC ACID 100 MG/ML
1000 INJECTION, SOLUTION INTRAVENOUS ONCE
Refills: 0 | Status: COMPLETED | OUTPATIENT
Start: 2020-08-24 | End: 2020-08-24

## 2020-08-24 RX ORDER — ACETAMINOPHEN 500 MG
3 TABLET ORAL
Qty: 0 | Refills: 0 | DISCHARGE
Start: 2020-08-24

## 2020-08-24 RX ORDER — PRAMOXINE HYDROCHLORIDE 150 MG/15G
1 AEROSOL, FOAM RECTAL EVERY 4 HOURS
Refills: 0 | Status: DISCONTINUED | OUTPATIENT
Start: 2020-08-24 | End: 2020-08-28

## 2020-08-24 RX ORDER — CARBOPROST TROMETHAMINE 250 UG/ML
250 INJECTION, SOLUTION INTRAMUSCULAR ONCE
Refills: 0 | Status: COMPLETED | OUTPATIENT
Start: 2020-08-24 | End: 2020-08-24

## 2020-08-24 RX ORDER — LANOLIN
1 OINTMENT (GRAM) TOPICAL EVERY 6 HOURS
Refills: 0 | Status: DISCONTINUED | OUTPATIENT
Start: 2020-08-24 | End: 2020-08-28

## 2020-08-24 RX ORDER — SODIUM CHLORIDE 9 MG/ML
300 INJECTION INTRAMUSCULAR; INTRAVENOUS; SUBCUTANEOUS ONCE
Refills: 0 | Status: COMPLETED | OUTPATIENT
Start: 2020-08-24 | End: 2020-08-24

## 2020-08-24 RX ORDER — DIPHENHYDRAMINE HCL 50 MG
25 CAPSULE ORAL EVERY 6 HOURS
Refills: 0 | Status: DISCONTINUED | OUTPATIENT
Start: 2020-08-24 | End: 2020-08-28

## 2020-08-24 RX ORDER — ACETAMINOPHEN 500 MG
975 TABLET ORAL
Refills: 0 | Status: DISCONTINUED | OUTPATIENT
Start: 2020-08-24 | End: 2020-08-28

## 2020-08-24 RX ORDER — DIPHENOXYLATE HCL/ATROPINE 2.5-.025MG
2 TABLET ORAL ONCE
Refills: 0 | Status: DISCONTINUED | OUTPATIENT
Start: 2020-08-24 | End: 2020-08-24

## 2020-08-24 RX ORDER — OXYCODONE HYDROCHLORIDE 5 MG/1
5 TABLET ORAL
Refills: 0 | Status: DISCONTINUED | OUTPATIENT
Start: 2020-08-24 | End: 2020-08-28

## 2020-08-24 RX ORDER — AMPICILLIN TRIHYDRATE 250 MG
2 CAPSULE ORAL ONCE
Refills: 0 | Status: COMPLETED | OUTPATIENT
Start: 2020-08-24 | End: 2020-08-24

## 2020-08-24 RX ORDER — DIBUCAINE 1 %
1 OINTMENT (GRAM) RECTAL EVERY 6 HOURS
Refills: 0 | Status: DISCONTINUED | OUTPATIENT
Start: 2020-08-24 | End: 2020-08-28

## 2020-08-24 RX ORDER — OXYTOCIN 10 UNIT/ML
4 VIAL (ML) INJECTION
Qty: 30 | Refills: 0 | Status: DISCONTINUED | OUTPATIENT
Start: 2020-08-24 | End: 2020-08-28

## 2020-08-24 RX ORDER — MAGNESIUM HYDROXIDE 400 MG/1
30 TABLET, CHEWABLE ORAL
Refills: 0 | Status: DISCONTINUED | OUTPATIENT
Start: 2020-08-24 | End: 2020-08-28

## 2020-08-24 RX ORDER — HYDROCORTISONE 1 %
1 OINTMENT (GRAM) TOPICAL EVERY 6 HOURS
Refills: 0 | Status: DISCONTINUED | OUTPATIENT
Start: 2020-08-24 | End: 2020-08-28

## 2020-08-24 RX ORDER — CITRIC ACID/SODIUM CITRATE 300-500 MG
15 SOLUTION, ORAL ORAL EVERY 6 HOURS
Refills: 0 | Status: DISCONTINUED | OUTPATIENT
Start: 2020-08-24 | End: 2020-08-24

## 2020-08-24 RX ORDER — SODIUM CHLORIDE 9 MG/ML
3 INJECTION INTRAMUSCULAR; INTRAVENOUS; SUBCUTANEOUS EVERY 8 HOURS
Refills: 0 | Status: DISCONTINUED | OUTPATIENT
Start: 2020-08-24 | End: 2020-08-28

## 2020-08-24 RX ORDER — OXYTOCIN 10 UNIT/ML
333.33 VIAL (ML) INJECTION
Qty: 20 | Refills: 0 | Status: DISCONTINUED | OUTPATIENT
Start: 2020-08-24 | End: 2020-08-24

## 2020-08-24 RX ADMIN — SODIUM CHLORIDE 300 MILLILITER(S): 9 INJECTION INTRAMUSCULAR; INTRAVENOUS; SUBCUTANEOUS at 17:35

## 2020-08-24 RX ADMIN — Medication 975 MILLIGRAM(S): at 21:55

## 2020-08-24 RX ADMIN — Medication 0.2 MILLIGRAM(S): at 19:24

## 2020-08-24 RX ADMIN — Medication 15 MILLILITER(S): at 06:27

## 2020-08-24 RX ADMIN — Medication 108 GRAM(S): at 17:26

## 2020-08-24 RX ADMIN — Medication 4 MILLIUNIT(S)/MIN: at 11:03

## 2020-08-24 RX ADMIN — Medication 108 GRAM(S): at 09:39

## 2020-08-24 RX ADMIN — Medication 2 TABLET(S): at 20:57

## 2020-08-24 RX ADMIN — Medication 10 UNIT(S): at 19:25

## 2020-08-24 RX ADMIN — ONDANSETRON 4 MILLIGRAM(S): 8 TABLET, FILM COATED ORAL at 19:55

## 2020-08-24 RX ADMIN — TRANEXAMIC ACID 220 MILLIGRAM(S): 100 INJECTION, SOLUTION INTRAVENOUS at 19:30

## 2020-08-24 RX ADMIN — Medication 30 MILLIGRAM(S): at 23:35

## 2020-08-24 RX ADMIN — Medication 216 GRAM(S): at 05:41

## 2020-08-24 RX ADMIN — Medication 108 GRAM(S): at 13:35

## 2020-08-24 RX ADMIN — CARBOPROST TROMETHAMINE 250 MICROGRAM(S): 250 INJECTION, SOLUTION INTRAMUSCULAR at 20:25

## 2020-08-24 NOTE — DISCHARGE NOTE OB - PATIENT PORTAL LINK FT
You can access the FollowMyHealth Patient Portal offered by [facility] by registering at the following website: [email or web address]. By joining [facility]’s FollowMyHealth portal, you will also be able to view your health information using other applications (apps) compatible with our system.

## 2020-08-24 NOTE — PROGRESS NOTE ADULT - SUBJECTIVE AND OBJECTIVE BOX
Postpartum Note-  Section POD#1    S:Patient s/p  with PPH of 800cc, complaining of neck pain. The pain is 8/10 located midline and worse with moving her head with nausea. Pt states Denies vomiting, appropriate abdominal pain s/p vaginal delivery.    O:  Vital Signs Last 24 Hrs  T(C): 37 (24 Aug 2020 19:50), Max: 37 (24 Aug 2020 19:50)  T(F): 98.6 (24 Aug 2020 19:50), Max: 98.6 (24 Aug 2020 19:50)  HR: 82 (24 Aug 2020 22:50) (82 - 99)  BP: 107/67 (24 Aug 2020 22:50) (95/50 - 138/85)  BP(mean): 80 (24 Aug 2020 22:50) (78 - 87)  RR: --  SpO2: 92% (24 Aug 2020 22:50) (92% - 96%)  I&O's Summary    24 Aug 2020 07:01  -  24 Aug 2020 23:21  --------------------------------------------------------  IN: 2000 mL / OUT: 1850 mL / NET: 150 mL        Gen: NAD  Abdomen: Soft, appropriate tenderness, non-distended, fundus firm.  Lochia WNL  Ext:Nontender    LABS:             9.7    16.41 )-----------( 287      (  @ 20:16 )             30.3                10.4   13.01 )-----------( 260      (  @ 05:37 )             32.8         A/P:  35y s/p  evaluated in PACU with neck pain and nausea.  Doing well    - CBC now   - Vital signs stable  - Hemodynamically stable  - Torodol for pain management    - Anesthesia consult   - Discussed with Dr. Farnaz Valerio PA-C

## 2020-08-24 NOTE — DISCHARGE NOTE OB - CARE PROVIDER_API CALL
Ying Cisneros (DO)  [place] [address]  Phone: [phone]  Fax: [phone]  Follow Up Time:

## 2020-08-24 NOTE — DISCHARGE NOTE OB - HOSPITAL COURSE
She presented with rupture of membranes and had a vaginal delivery of body   She had a postpartum hemorrhage due to uterine atony that resolved with medications.   Boy for circumcision She presented with rupture of membranes and had a vaginal delivery of body   She had a postpartum hemorrhage due to uterine atony that resolved with medications.   She had a periclitoral tear and 2nd degree laceration that was repaired.   Boy for circumcision

## 2020-08-24 NOTE — DISCHARGE NOTE OB - MATERIALS PROVIDED
[facility] Hearing Screen Program/Guide to Postpartum Care/Birth Certificate Instructions/[facility] [place] Screening Program/Bottle Feeding Log [facility] Hearing Screen Program/Shaken Baby Prevention Handout/Guide to Postpartum Care/[facility] [place] Screening Program/Back To Sleep Handout/Birth Certificate Instructions/Bottle Feeding Log

## 2020-08-24 NOTE — DISCHARGE NOTE OB - CARE PLAN
Principal Discharge DX:	Vaginal delivery  Goal:	return to normal health  Assessment and plan of treatment:	nothing in the vagina  x 6 weeks

## 2020-08-25 LAB
HCT VFR BLD CALC: 24.8 % — LOW (ref 34.5–45)
HGB BLD-MCNC: 8 G/DL — LOW (ref 11.5–15.5)
MCHC RBC-ENTMCNC: 26.8 PG — LOW (ref 27–34)
MCHC RBC-ENTMCNC: 32.3 GM/DL — SIGNIFICANT CHANGE UP (ref 32–36)
MCV RBC AUTO: 82.9 FL — SIGNIFICANT CHANGE UP (ref 80–100)
NRBC # BLD: 0 /100 WBCS — SIGNIFICANT CHANGE UP (ref 0–0)
PLATELET # BLD AUTO: 246 K/UL — SIGNIFICANT CHANGE UP (ref 150–400)
RBC # BLD: 2.99 M/UL — LOW (ref 3.8–5.2)
RBC # FLD: 15.5 % — HIGH (ref 10.3–14.5)
WBC # BLD: 24.51 K/UL — HIGH (ref 3.8–10.5)
WBC # FLD AUTO: 24.51 K/UL — HIGH (ref 3.8–10.5)

## 2020-08-25 RX ORDER — FERROUS SULFATE 325(65) MG
325 TABLET ORAL THREE TIMES A DAY
Refills: 0 | Status: DISCONTINUED | OUTPATIENT
Start: 2020-08-25 | End: 2020-08-28

## 2020-08-25 RX ORDER — FERROUS SULFATE 325(65) MG
325 TABLET ORAL DAILY
Refills: 0 | Status: DISCONTINUED | OUTPATIENT
Start: 2020-08-25 | End: 2020-08-25

## 2020-08-25 RX ORDER — IBUPROFEN 200 MG
600 TABLET ORAL EVERY 6 HOURS
Refills: 0 | Status: DISCONTINUED | OUTPATIENT
Start: 2020-08-25 | End: 2020-08-28

## 2020-08-25 RX ORDER — ERTAPENEM SODIUM 1 G/1
1000 INJECTION, POWDER, LYOPHILIZED, FOR SOLUTION INTRAMUSCULAR; INTRAVENOUS ONCE
Refills: 0 | Status: COMPLETED | OUTPATIENT
Start: 2020-08-25 | End: 2020-08-25

## 2020-08-25 RX ORDER — ASCORBIC ACID 60 MG
500 TABLET,CHEWABLE ORAL DAILY
Refills: 0 | Status: DISCONTINUED | OUTPATIENT
Start: 2020-08-25 | End: 2020-08-28

## 2020-08-25 RX ADMIN — OXYCODONE HYDROCHLORIDE 5 MILLIGRAM(S): 5 TABLET ORAL at 20:22

## 2020-08-25 RX ADMIN — Medication 325 MILLIGRAM(S): at 14:05

## 2020-08-25 RX ADMIN — Medication 975 MILLIGRAM(S): at 08:11

## 2020-08-25 RX ADMIN — OXYCODONE HYDROCHLORIDE 5 MILLIGRAM(S): 5 TABLET ORAL at 16:52

## 2020-08-25 RX ADMIN — OXYCODONE HYDROCHLORIDE 5 MILLIGRAM(S): 5 TABLET ORAL at 11:40

## 2020-08-25 RX ADMIN — Medication 600 MILLIGRAM(S): at 05:50

## 2020-08-25 RX ADMIN — OXYCODONE HYDROCHLORIDE 5 MILLIGRAM(S): 5 TABLET ORAL at 03:05

## 2020-08-25 RX ADMIN — Medication 500 MILLIGRAM(S): at 12:28

## 2020-08-25 RX ADMIN — OXYCODONE HYDROCHLORIDE 5 MILLIGRAM(S): 5 TABLET ORAL at 17:30

## 2020-08-25 RX ADMIN — Medication 325 MILLIGRAM(S): at 23:39

## 2020-08-25 RX ADMIN — Medication 600 MILLIGRAM(S): at 17:30

## 2020-08-25 RX ADMIN — OXYCODONE HYDROCHLORIDE 5 MILLIGRAM(S): 5 TABLET ORAL at 11:03

## 2020-08-25 RX ADMIN — Medication 975 MILLIGRAM(S): at 20:19

## 2020-08-25 RX ADMIN — Medication 600 MILLIGRAM(S): at 16:52

## 2020-08-25 RX ADMIN — Medication 975 MILLIGRAM(S): at 08:50

## 2020-08-25 RX ADMIN — OXYCODONE HYDROCHLORIDE 5 MILLIGRAM(S): 5 TABLET ORAL at 05:50

## 2020-08-25 RX ADMIN — ERTAPENEM SODIUM 120 MILLIGRAM(S): 1 INJECTION, POWDER, LYOPHILIZED, FOR SOLUTION INTRAMUSCULAR; INTRAVENOUS at 01:18

## 2020-08-25 RX ADMIN — OXYCODONE HYDROCHLORIDE 5 MILLIGRAM(S): 5 TABLET ORAL at 23:39

## 2020-08-25 RX ADMIN — Medication 975 MILLIGRAM(S): at 21:00

## 2020-08-25 RX ADMIN — Medication 600 MILLIGRAM(S): at 23:39

## 2020-08-25 RX ADMIN — Medication 600 MILLIGRAM(S): at 11:40

## 2020-08-25 RX ADMIN — Medication 600 MILLIGRAM(S): at 11:03

## 2020-08-25 RX ADMIN — Medication 975 MILLIGRAM(S): at 14:05

## 2020-08-25 RX ADMIN — OXYCODONE HYDROCHLORIDE 5 MILLIGRAM(S): 5 TABLET ORAL at 02:46

## 2020-08-25 RX ADMIN — Medication 600 MILLIGRAM(S): at 05:10

## 2020-08-25 RX ADMIN — Medication 1 TABLET(S): at 12:28

## 2020-08-25 RX ADMIN — SODIUM CHLORIDE 3 MILLILITER(S): 9 INJECTION INTRAMUSCULAR; INTRAVENOUS; SUBCUTANEOUS at 06:26

## 2020-08-25 RX ADMIN — OXYCODONE HYDROCHLORIDE 5 MILLIGRAM(S): 5 TABLET ORAL at 05:10

## 2020-08-25 RX ADMIN — Medication 975 MILLIGRAM(S): at 14:35

## 2020-08-25 NOTE — PROGRESS NOTE ADULT - SUBJECTIVE AND OBJECTIVE BOX
Called to see patient due to headache.  Currently POD 1 s/p .  On review of anesthesia record, patient did require multiple attempts at placement, no documentation of wet tap occuring.  On arrival to bedside, patient with head at an approximate 30 degree angle and comfortable.  She describes increasing headache both in the complete supine position and with sitting up higher.    Headache described to be mostly frontal around eyes "makes my eyes heavy and fades when I lay back".  While atypical given her ability to lay at a 30 degree angle with exascerbation while both flat and completely upright PDPH cannot be ruled out.    Treatment options presented to the patient including epidural blood patch.  At this time patient does not want a blood patch given the difficulty in placing her previous labor epidurals.  Recommended increased PO hydration and caffeinated beverages at this time.  Should patient change her mind we will place blood patch.

## 2020-08-25 NOTE — PROGRESS NOTE ADULT - SUBJECTIVE AND OBJECTIVE BOX
OBGYN PACU Note     Day 1    Pt seen and evaluated at bedside with Dr. Kar Angel prior to d/c to postpartum floor. Pt states her neck pain improved after taking Toradol, but is still persistent. Denies chest pain, SOB, dizziness, nausea, vomiting.       Vital Signs Last 24 Hrs  T(C): 37 (24 Aug 2020 19:50), Max: 37 (24 Aug 2020 19:50)  T(F): 98.6 (24 Aug 2020 19:50), Max: 98.6 (24 Aug 2020 19:50)  HR: 80 (25 Aug 2020 00:23) (78 - 99)  BP: 116/56 (25 Aug 2020 00:23) (95/50 - 138/85)  BP(mean): 79 (25 Aug 2020 00:23) (78 - 87)  RR: --  SpO2: 94% (25 Aug 2020 00:23) (92% - 97%)                          8.0    24.51 )-----------( 246      ( 24 Aug 2020 23:50 )             24.8   baso x      eos x      imm gran x      lymph x      mono x      poly x                            9.7    16.41 )-----------( 287      ( 24 Aug 2020 20:16 )             30.3   baso 0.2    eos 0.4    imm gran 0.7    lymph 15.8   mono 7.2    poly 75.7                         10.4   13.01 )-----------( 260      ( 24 Aug 2020 05:37 )             32.8   baso 0.4    eos 1.5    imm gran 0.8    lymph 22.0   mono 8.5    poly 66.8       Exam  VSS /59, HR82  Gen: NAD, resting comfortably in bed  Abd: soft, appropriate tenderness, fundus firm, lochia normal  Uout: clear urine, adequate

## 2020-08-25 NOTE — PROGRESS NOTE ADULT - SUBJECTIVE AND OBJECTIVE BOX
OB Progress Note:  PPD#1    S: 34yo  PPD#1 s/p . Patient feels well. Pain is well controlled, tolerating regular diet, passing flatus, voiding spontaneously, ambulating without difficulty. Denies heavy vaginal bleeding, CP/SOB, N/V, lightheadedness/dizziness. Pt complaining of mild neck pain.     O:  Vitals:  Vital Signs Last 24 Hrs  T(C): 36.7 (25 Aug 2020 05:28), Max: 37.1 (25 Aug 2020 02:35)  T(F): 98 (25 Aug 2020 05:28), Max: 98.7 (25 Aug 2020 02:35)  HR: 76 (25 Aug 2020 05:28) (76 - 99)  BP: 116/75 (25 Aug 2020 05:28) (95/50 - 138/85)  BP(mean): 85 (25 Aug 2020 01:20) (78 - 87)  RR: 18 (25 Aug 2020 05:28) (18 - 18)  SpO2: 96% (25 Aug 2020 02:35) (92% - 97%)    MEDICATIONS  (STANDING):  acetaminophen   Tablet .. 975 milliGRAM(s) Oral <User Schedule>  ascorbic acid 500 milliGRAM(s) Oral daily  diphtheria/tetanus/pertussis (acellular) Vaccine (ADAcel) 0.5 milliLiter(s) IntraMuscular once  ferrous    sulfate 325 milliGRAM(s) Oral daily  ibuprofen  Tablet. 600 milliGRAM(s) Oral every 6 hours  oxytocin Infusion 333.333 milliUNIT(s)/Min (1000 mL/Hr) IV Continuous <Continuous>  oxytocin Infusion 4 milliUNIT(s)/Min (4 mL/Hr) IV Continuous <Continuous>  prenatal multivitamin 1 Tablet(s) Oral daily  sodium chloride 0.9% lock flush 3 milliLiter(s) IV Push every 8 hours  sodium chloride 0.9%. 1000 milliLiter(s) (150 mL/Hr) IV Continuous <Continuous>      Labs:  Blood type: AB Positive  Rubella IgG: RPR: Negative                          8.0<L>   24.51<H> >-----------< 246    (  @ 23:50 )             24.8<L>                        9.7<L>   16.41<H> >-----------< 287    (  @ 20:16 )             30.3<L>                        10.4<L>   13.01<H> >-----------< 260    (  @ 05:37 )             32.8<L>    20 @ 20:16      135  |  105  |  7   ----------------------------<  105<H>  3.5   |  17<L>  |  0.62        Ca    8.2<L>      24 Aug 2020 20:16    TPro  5.3<L>  /  Alb  2.8<L>  /  TBili  0.3  /  DBili  x   /  AST  21  /  ALT  15  /  AlkPhos  167<H>  20 @ 20:16          Physical Exam:  General: NAD  Abdomen: soft, non-tender, non-distended, fundus firm  Vaginal: No heavy vaginal bleeding  Extremities: No erythema/edema

## 2020-08-25 NOTE — PROGRESS NOTE ADULT - ATTENDING COMMENTS
PT STATES HAD BAD HEAD ACHE & NECK ACHE WHEN EPI CATH WAS IN. IT MARKEDLY IMPROVED WHEN CATH WAS REMOVED. NOW C/O HA & NECK ACHE WHEN SHE SITS UP. BETTER WHEN SUPINE. SAID IT TOOK 3-4 TRIES TO GET EPI IN. ANESTHESIA CALLED TO EVALUATE FOR SPINAL HA  FIRM FUNDUS  LOCHIA SCANT  ANEMIA 2 TO ACUTE BLOOD LOSS- IRON TID  circ care discussed  DERRICK MYERS

## 2020-08-26 RX ORDER — ACETAMINOPHEN 500 MG
1000 TABLET ORAL ONCE
Refills: 0 | Status: COMPLETED | OUTPATIENT
Start: 2020-08-26 | End: 2020-08-26

## 2020-08-26 RX ORDER — CYCLOBENZAPRINE HYDROCHLORIDE 10 MG/1
5 TABLET, FILM COATED ORAL ONCE
Refills: 0 | Status: COMPLETED | OUTPATIENT
Start: 2020-08-26 | End: 2020-08-26

## 2020-08-26 RX ADMIN — Medication 600 MILLIGRAM(S): at 22:04

## 2020-08-26 RX ADMIN — Medication 10 MILLIGRAM(S): at 23:59

## 2020-08-26 RX ADMIN — OXYCODONE HYDROCHLORIDE 5 MILLIGRAM(S): 5 TABLET ORAL at 11:00

## 2020-08-26 RX ADMIN — Medication 325 MILLIGRAM(S): at 14:21

## 2020-08-26 RX ADMIN — Medication 600 MILLIGRAM(S): at 23:23

## 2020-08-26 RX ADMIN — OXYCODONE HYDROCHLORIDE 5 MILLIGRAM(S): 5 TABLET ORAL at 07:30

## 2020-08-26 RX ADMIN — SODIUM CHLORIDE 3 MILLILITER(S): 9 INJECTION INTRAMUSCULAR; INTRAVENOUS; SUBCUTANEOUS at 23:21

## 2020-08-26 RX ADMIN — Medication 975 MILLIGRAM(S): at 02:47

## 2020-08-26 RX ADMIN — Medication 600 MILLIGRAM(S): at 15:00

## 2020-08-26 RX ADMIN — Medication 975 MILLIGRAM(S): at 08:45

## 2020-08-26 RX ADMIN — Medication 1000 MILLIGRAM(S): at 22:05

## 2020-08-26 RX ADMIN — OXYCODONE HYDROCHLORIDE 5 MILLIGRAM(S): 5 TABLET ORAL at 15:00

## 2020-08-26 RX ADMIN — Medication 325 MILLIGRAM(S): at 22:04

## 2020-08-26 RX ADMIN — Medication 400 MILLIGRAM(S): at 20:13

## 2020-08-26 RX ADMIN — CYCLOBENZAPRINE HYDROCHLORIDE 5 MILLIGRAM(S): 10 TABLET, FILM COATED ORAL at 11:08

## 2020-08-26 RX ADMIN — OXYCODONE HYDROCHLORIDE 5 MILLIGRAM(S): 5 TABLET ORAL at 06:58

## 2020-08-26 RX ADMIN — OXYCODONE HYDROCHLORIDE 5 MILLIGRAM(S): 5 TABLET ORAL at 14:21

## 2020-08-26 RX ADMIN — Medication 600 MILLIGRAM(S): at 00:15

## 2020-08-26 RX ADMIN — Medication 600 MILLIGRAM(S): at 14:21

## 2020-08-26 RX ADMIN — Medication 600 MILLIGRAM(S): at 23:58

## 2020-08-26 RX ADMIN — Medication 975 MILLIGRAM(S): at 08:17

## 2020-08-26 RX ADMIN — Medication 600 MILLIGRAM(S): at 06:58

## 2020-08-26 RX ADMIN — Medication 600 MILLIGRAM(S): at 07:40

## 2020-08-26 RX ADMIN — OXYCODONE HYDROCHLORIDE 5 MILLIGRAM(S): 5 TABLET ORAL at 10:19

## 2020-08-26 NOTE — PROGRESS NOTE ADULT - SUBJECTIVE AND OBJECTIVE BOX
Chief Complaint:  neck pain    HPI:     Called by OB team to evaluate possible epidural blood patch. Currently POD 2 s/p . Patient reports 8/10 neck and lower skull pain 2 hours following epidural placement. headache has not improved.  Headache is present when sitting up or walking, but can still be persistent while supine. Even small changes in supine positioning can exacerbate headache. patient unable to spend meaningful time with  2/2 to neck pain. denies visual changes. denies frontal headache, denies back pain. denies weakness or other deficits Pt has tried medications like advil with mild improvement, On review of the anesthesia record, multiple attempts were recorded for placement, no documentation of dural puncture or obvious CSF.         FAMILY HISTORY: non contributory       SOCIAL HISTORY:  [x] Denies Smoking, Alcohol, or Drug Use    Allergies    Allergy Status Unknown    Intolerances        PAIN MEDICATIONS:  acetaminophen   Tablet .. 975 milliGRAM(s) Oral <User Schedule>  diphenhydrAMINE 25 milliGRAM(s) Oral every 6 hours PRN  ibuprofen  Tablet. 600 milliGRAM(s) Oral every 6 hours  oxyCODONE    IR 5 milliGRAM(s) Oral every 3 hours PRN  oxyCODONE    IR 5 milliGRAM(s) Oral once PRN    Heme:    Antibiotics:    Cardiovascular:    GI:  magnesium hydroxide Suspension 30 milliLiter(s) Oral two times a day PRN  simethicone 80 milliGRAM(s) Chew every 4 hours PRN    Endocrine:    All Other Medications:  ascorbic acid 500 milliGRAM(s) Oral daily  benzocaine 20%/menthol 0.5% Spray 1 Spray(s) Topical every 6 hours PRN  dibucaine 1% Ointment 1 Application(s) Topical every 6 hours PRN  diphtheria/tetanus/pertussis (acellular) Vaccine (ADAcel) 0.5 milliLiter(s) IntraMuscular once  ferrous    sulfate 325 milliGRAM(s) Oral three times a day  hydrocortisone 1% Cream 1 Application(s) Topical every 6 hours PRN  lanolin Ointment 1 Application(s) Topical every 6 hours PRN  oxytocin Infusion 333.333 milliUNIT(s)/Min IV Continuous <Continuous>  oxytocin Infusion 4 milliUNIT(s)/Min IV Continuous <Continuous>  pramoxine 1%/zinc 5% Cream 1 Application(s) Topical every 4 hours PRN  prenatal multivitamin 1 Tablet(s) Oral daily  sodium chloride 0.9% lock flush 3 milliLiter(s) IV Push every 8 hours  sodium chloride 0.9%. 1000 milliLiter(s) IV Continuous <Continuous>  witch hazel Pads 1 Application(s) Topical every 4 hours PRN      REVIEW OF SYSTEMS:    CONSTITUTIONAL: No fever, weight loss, or fatigue  EYES: No eye pain, visual disturbances, or discharge  ENMT:  No difficulty hearing, tinnitus, vertigo; No sinus or throat pain  NECK: +++mpain and stiffness   BREASTS: No pain, masses, or nipple discharge  RESPIRATORY: No cough, wheezing, chills or hemoptysis; No shortness of breath  CARDIOVASCULAR: No chest pain, palpitations, dizziness, or leg swelling  GASTROINTESTINAL: No abdominal or epigastric pain. No nausea, vomiting, or hematemesis; No diarrhea or constipation. No melena or hematochezia.  GENITOURINARY: No dysuria, frequency, hematuria, or incontinence  NEUROLOGICAL: No headaches, memory loss, loss of strength, numbness, or tremors  SKIN: No itching, burning, rashes, or lesions   LYMPH NODES: No enlarged glands  ENDOCRINE: No heat or cold intolerance; No hair loss  MUSCULOSKELETAL: No joint pain or swelling; No muscle, back, or extremity pain  PSYCHIATRIC: No depression, anxiety, mood swings, or difficulty sleeping  HEME/LYMPH: No easy bruising, or bleeding gums  ALLERY AND IMMUNOLOGIC: No hives or eczema      Vital Signs Last 24 Hrs  T(C): 37.1 (26 Aug 2020 15:09), Max: 37.1 (26 Aug 2020 15:09)  T(F): 98.8 (26 Aug 2020 15:09), Max: 98.8 (26 Aug 2020 15:09)  HR: 78 (26 Aug 2020 15:09) (76 - 78)  BP: 121/76 (26 Aug 2020 15:09) (118/78 - 121/76)  BP(mean): --  RR: 18 (26 Aug 2020 15:09) (18 - 18)  SpO2: 94% (26 Aug 2020 15:09) (94% - 98%)    PAIN SCORE:  8       SCALE USED: (1-10 VNRS)             PHYSICAL EXAM:    GENERAL: NAD, well-groomed, well-developed  HEAD:  Atraumatic, Normocephalic  EYES: EOMI, PERRLA, conjunctiva and sclera clear  ENMT: No tonsillar erythema, exudates, or enlargement; Moist mucous membranes, Good dentition, No lesions  NECK: tense, decreased ROM on flexion, extention and rotation.   NERVOUS SYSTEM:  Alert & Oriented X3, Good concentration; Motor Strength 5/5 B/L upper and lower extremities; DTRs 2+ intact and symmetric  CNII-XIII grossly intact        LABS:                          8.0    24.51 )-----------( 246      ( 24 Aug 2020 23:50 )             24.8         135  |  105  |  7   ----------------------------<  105<H>  3.5   |  17<L>  |  0.62    Ca    8.2<L>      24 Aug 2020 20:16    TPro  5.3<L>  /  Alb  2.8<L>  /  TBili  0.3  /  DBili  x   /  AST  21  /  ALT  15  /  AlkPhos  167<H>      PT/INR - ( 24 Aug 2020 20:16 )   PT: 11.8 sec;   INR: 0.99 ratio         PTT - ( 24 Aug 2020 20:16 )  PTT:24.1 sec            Given unclear improvement with position, unclear timing, pts clincal signs and symtoms are not entirely consistent with PDPH.  Other etiologies should be considered such as musculoskeletal related, pneumocephalus, postpartum-related, migaine, intracranial pathology. Would recommend a neurlogic evaluation and possible imaging if indicated.   If this is truly PDPH, it will likely resolve on its own. However, persistent and debilitating headaches have other treatment options that were discussed with pt in detail including conservative Rx and Epid blood patch plus risks. Pt prefers & agrees with further neurologic workup and other possibilities prior to invasive epidural blood patch procedure.  She understands that epidural blood patch may cause her lower back pain & may only help with the new positional headache & not the underlying symptoms.     Hydration and bedrest also recommended for now with non opioid analgesics as needed. Pt agrees with plan & discussed case with the  attending.  If any evidence of worsening low pressure headache with true visual/hearing changes, then epid blood patch may be considered more necessary.           Patient was seen 20 @ 14:06

## 2020-08-26 NOTE — CONSULT NOTE ADULT - SUBJECTIVE AND OBJECTIVE BOX
HPI: 36yo woman postpartum day 2 from spontaneous vaginal delivery h/o migraine headaches with Neurology consult for neck pain. Patient reports that she felt neck pain several hours this past Monday 8/24 after epidural injection for vaginal delivery. She reports the neck pain as combination of sharp and throbbing, starting in the back of the neck, and if she moves her head, she feels pain going down along the posterolateral sides of her neck into upper shoulder and also up behind the ears. Reports nausea/vomiting since delivery, though appetite is slowly improving and she received several medications in the past few days for the delivery. No associated visual disturbance (though she reports having issue with "focusing" due to the pain, but no long visual loss nor bright spots), difficulty swallowing, changes in voice quality, weakness in extremities, numbness/tingling in extremities, though reported having transient tingling (like "bugs crawling") upon both sides of her face that she is currently not experiencing. No history of smoking. She does not plan to breastfeed. No photophobia, phonophobia.     Regarding her migraine headaches, she has never been diagnosed nor been to a Neurologist, but believes them to be migraines due to white spots that develop in both of her eyes with associated left supraorbital pain and requiring a dark room for the migraine headache to subside. She reports not having had a migraine headache for about 1 year.     REVIEW OF SYSTEMS    A 10-system ROS was performed and is negative except for those items noted above and/or in the HPI.    PAST MEDICAL & SURGICAL HISTORY:    FAMILY HISTORY:    SOCIAL HISTORY:   T/E/D:   Occupation:   Lives with:     MEDICATIONS (HOME):  Home Medications:  acetaminophen 325 mg oral tablet: 3 tab(s) orally  (24 Aug 2020 22:13)  ibuprofen 600 mg oral tablet: 1 tab(s) orally every 6 hours (24 Aug 2020 22:13)    MEDICATIONS  (STANDING):  acetaminophen   Tablet .. 975 milliGRAM(s) Oral <User Schedule>  ascorbic acid 500 milliGRAM(s) Oral daily  diphtheria/tetanus/pertussis (acellular) Vaccine (ADAcel) 0.5 milliLiter(s) IntraMuscular once  ferrous    sulfate 325 milliGRAM(s) Oral three times a day  ibuprofen  Tablet. 600 milliGRAM(s) Oral every 6 hours  oxytocin Infusion 333.333 milliUNIT(s)/Min (1000 mL/Hr) IV Continuous <Continuous>  oxytocin Infusion 4 milliUNIT(s)/Min (4 mL/Hr) IV Continuous <Continuous>  prenatal multivitamin 1 Tablet(s) Oral daily  sodium chloride 0.9% lock flush 3 milliLiter(s) IV Push every 8 hours  sodium chloride 0.9%. 1000 milliLiter(s) (150 mL/Hr) IV Continuous <Continuous>    MEDICATIONS  (PRN):  benzocaine 20%/menthol 0.5% Spray 1 Spray(s) Topical every 6 hours PRN for Perineal discomfort  dibucaine 1% Ointment 1 Application(s) Topical every 6 hours PRN Perineal discomfort  diphenhydrAMINE 25 milliGRAM(s) Oral every 6 hours PRN Pruritus  hydrocortisone 1% Cream 1 Application(s) Topical every 6 hours PRN Moderate Pain (4-6)  lanolin Ointment 1 Application(s) Topical every 6 hours PRN nipple soreness  magnesium hydroxide Suspension 30 milliLiter(s) Oral two times a day PRN Constipation  oxyCODONE    IR 5 milliGRAM(s) Oral every 3 hours PRN Moderate to Severe Pain (4-10)  oxyCODONE    IR 5 milliGRAM(s) Oral once PRN Moderate to Severe Pain (4-10)  pramoxine 1%/zinc 5% Cream 1 Application(s) Topical every 4 hours PRN Moderate Pain (4-6)  simethicone 80 milliGRAM(s) Chew every 4 hours PRN Gas  witch hazel Pads 1 Application(s) Topical every 4 hours PRN Perineal discomfort    ALLERGIES/INTOLERANCES:  Allergies  Allergy Status Unknown    VITALS & EXAMINATION:  Vital Signs Last 24 Hrs  T(C): 37.1 (26 Aug 2020 15:09), Max: 37.1 (26 Aug 2020 15:09)  T(F): 98.8 (26 Aug 2020 15:09), Max: 98.8 (26 Aug 2020 15:09)  HR: 78 (26 Aug 2020 15:09) (76 - 78)  BP: 121/76 (26 Aug 2020 15:09) (118/78 - 121/76)  BP(mean): --  RR: 18 (26 Aug 2020 15:09) (18 - 18)  SpO2: 94% (26 Aug 2020 15:09) (94% - 98%)    Neurological (>12):  MS: Awake, alert, oriented to person, place, situation, time. Normal affect. Follows all commands.    Language: Speech is clear, fluent with good repetition & comprehension    CNs: PERRL (R = 4mm, L = 4mm). VFF. EOMI no nystagmus. V1-3 intact to LT/pinprick, well developed masseter muscles b/l. No facial asymmetry b/l, full eye closure strength b/l. Hearing grossly normal (rubbing fingers) b/l. Symmetric palate elevation in midline. Gag reflex deferred. Head turning & shoulder shrug intact b/l. Tongue midline, normal movements, no atrophy.    Fundoscopic: symmetrical sharp discs margins, blood vessels appear unremarkable, upon personal fundoscopic exam, though limited due to light in room and size of pupils     Motor: Normal muscle bulk & tone. No noticeable tremor. No pronator drift. Negative Kernig, Brudzinski. cervical neck region appears tight upon palpation, negative Lhermitte's signtight               Deltoid	Biceps	Triceps	Wrist	Finger ABd	   R	5	5	5	5	5		5 	  L	5	5	5	5	5		5    	H-Flex	H-Ext	H-ABd	H-ADd	K-Flex	K-Ext	D-Flex	P-Flex  R	5	5	5	5	5	5	5	5 	   L	5	5	5	5	5	5	5	5	     Sensation: Intact to LT b/l throughout.     Cortical: Extinction on DSS (neglect): none    Reflexes: diffusely brisk              Biceps(C5)       BR(C6)     Triceps(C7)               Patellar(L4)    Achilles(S1)    Plantar Resp  R	2	          2	             2		        2		    2		Down   L	2	          2	             2		        2		    2		Down     Coordination: intact rapid-alt movements. No dysmetria to FTN    Gait: No postural instability. Normal stance and tandem gait.     LABORATORY:  CBC                       8.0    24.51 )-----------( 246      ( 24 Aug 2020 23:50 )             24.8     Chem 08-24    135  |  105  |  7   ----------------------------<  105<H>  3.5   |  17<L>  |  0.62    Ca    8.2<L>      24 Aug 2020 20:16    TPro  5.3<L>  /  Alb  2.8<L>  /  TBili  0.3  /  DBili  x   /  AST  21  /  ALT  15  /  AlkPhos  167<H>  08-24    LFTs LIVER FUNCTIONS - ( 24 Aug 2020 20:16 )  Alb: 2.8 g/dL / Pro: 5.3 g/dL / ALK PHOS: 167 U/L / ALT: 15 U/L / AST: 21 U/L / GGT: x           Coagulopathy PT/INR - ( 24 Aug 2020 20:16 )   PT: 11.8 sec;   INR: 0.99 ratio         PTT - ( 24 Aug 2020 20:16 )  PTT:24.1 sec  Lipid Panel   A1c   Cardiac enzymes     U/A   CSF  Immunological  Other    STUDIES & IMAGING:  Studies (EKG, EEG, EMG, etc):     Radiology (XR, CT, MR, U/S, TTE/SPENCER):    no neuroimaging at this time

## 2020-08-26 NOTE — CONSULT NOTE ADULT - ASSESSMENT
Assessment:  34yo woman postpartum day 2 from spontaneous vaginal delivery h/o migraine headaches with Neurology consult for neck pain. Neurological examination nonfocal. No neuroimaging at this time.     Impression: tight neck muscles without concerning neurological findings 2/2 possible bilateral occipital neuralgia or other similar nerve impingement in the setting of fluid shifts since delivery, low suspicion of PDPH, low suspicion of dissection    Recommendations:  [ ] to discuss with Attending appropriate pain management  [ ] neuroimaging not warranted at this time    -Management & disposition to be discussed with Dr. Tucker Neurology Attending Assessment:  34yo woman postpartum day 2 from spontaneous vaginal delivery h/o migraine headaches with Neurology consult for neck pain. Neurological examination nonfocal. No neuroimaging at this time.     Impression: tight neck muscles without concerning neurological findings 2/2 possible bilateral occipital neuralgia vs PDPH, low suspicion of dissection    Recommendations:  [ ] CTH noncon and CT cervical spine without contrast  [ ] may trial IV tylenol 1g x 1  [ ] if tylenol does not alleviate pain, may trial 1 x prednisone low dose (10mg)    -Management & disposition discussed with Dr. Tucker Neurology Attending Assessment:  34yo woman postpartum day 2 from spontaneous vaginal delivery h/o migraine headaches with Neurology consult for neck pain. Neurological examination nonfocal.    Impression: tight neck muscles without concerning neurological findings 2/2 possible bilateral occipital neuralgia vs PDPH, low suspicion of dissection    Recommendations:  [ ] CTH noncon and CT cervical spine without contrast  [ ] may trial IV tylenol 1g x 1  [ ] if tylenol does not alleviate pain, may trial 1 x prednisone low dose (10mg)    -Management & disposition discussed with Dr. Tucker Neurology Attending

## 2020-08-26 NOTE — CHART NOTE - NSCHARTNOTEFT_GEN_A_CORE
R1 Progress Note    S:  Called by pts nurse stating that pt is still complaining of significant head and neck pain. As per neurology consult note, IV tylenol was given to pt w/ no relief. Spoke to Dr. Joshua Alexandra from neurology who confirmed the plan to give pt oral prednisolone 10mg and to obtain CT Head & C/S without contrast. At bedside, pt notes that she is having significant pain in her shoulder and neck to the base of her skull. Denies changes in vision/sensation or weakness. Pain from delivery is well controlled. Pt able to ambulate and tolerate regular diet.    O:  VS  T(C): 37.1 (20 @ 15:09)  HR: 78 (20 @ 15:09)  BP: 121/76 (20 @ 15:09)  RR: 18 (20 @ 15:09)  SpO2: 94% (20 @ 15:09)    PE  General: pt appears to be in significant pain when she tries to move  Neck: tenderness to palpation along cervical spine  Resp: nonlabored breathing  Abdomen: nontender, nondistended, uterus firm  Pelvic: lochia wnl    A/P: Pt is a 36yo  PPD2 from  complaining of significant neck pain.  -vital signs stable, no acute neurological deficits   -neurology consulted-suggested IV tylenol, oral prednisolone, & to obtain CT head/cervical spine  -IV tylenol does not cover pain, prednisolone ordered  -f/u CT Head & Neck  -pt understands current management plan    Miguel Ibarra, PGY1  D/w Dr. Méndez

## 2020-08-26 NOTE — PROGRESS NOTE ADULT - SUBJECTIVE AND OBJECTIVE BOX
Patient seen and examined at bedside, no acute overnight events. No acute complaints, pain well controlled. Patient is ambulating, voiding spontaneously, passing gas, and tolerating regular diet. Denies CP, SOB, N/V, HA, blurred vision, epigastric pain.    Vital Signs Last 24 Hours  T(C): 36.9 (08-25-20 @ 17:19), Max: 36.9 (08-25-20 @ 17:19)  HR: 76 (08-25-20 @ 17:19) (74 - 83)  BP: 118/78 (08-25-20 @ 17:19) (110/73 - 125/82)  RR: 18 (08-25-20 @ 17:19) (18 - 18)  SpO2: 98% (08-25-20 @ 17:19) (98% - 100%)    Physical Exam:  General: NAD  Abdomen: Soft, non-tender, non-distended, fundus firm  Pelvic: Lochia wnl    Labs:    Blood Type: AB Positive  Antibody Screen: Negative  RPR: Negative               8.0    24.51 )-----------( 246      ( 08-24 @ 23:50 )             24.8                9.7    16.41 )-----------( 287      ( 08-24 @ 20:16 )             30.3                10.4   13.01 )-----------( 260      ( 08-24 @ 05:37 )             32.8         MEDICATIONS  (STANDING):  acetaminophen   Tablet .. 975 milliGRAM(s) Oral <User Schedule>  ascorbic acid 500 milliGRAM(s) Oral daily  diphtheria/tetanus/pertussis (acellular) Vaccine (ADAcel) 0.5 milliLiter(s) IntraMuscular once  ferrous    sulfate 325 milliGRAM(s) Oral three times a day  ibuprofen  Tablet. 600 milliGRAM(s) Oral every 6 hours  oxytocin Infusion 333.333 milliUNIT(s)/Min (1000 mL/Hr) IV Continuous <Continuous>  oxytocin Infusion 4 milliUNIT(s)/Min (4 mL/Hr) IV Continuous <Continuous>  prenatal multivitamin 1 Tablet(s) Oral daily  sodium chloride 0.9% lock flush 3 milliLiter(s) IV Push every 8 hours  sodium chloride 0.9%. 1000 milliLiter(s) (150 mL/Hr) IV Continuous <Continuous>    MEDICATIONS  (PRN):  benzocaine 20%/menthol 0.5% Spray 1 Spray(s) Topical every 6 hours PRN for Perineal discomfort  dibucaine 1% Ointment 1 Application(s) Topical every 6 hours PRN Perineal discomfort  diphenhydrAMINE 25 milliGRAM(s) Oral every 6 hours PRN Pruritus  hydrocortisone 1% Cream 1 Application(s) Topical every 6 hours PRN Moderate Pain (4-6)  lanolin Ointment 1 Application(s) Topical every 6 hours PRN nipple soreness  magnesium hydroxide Suspension 30 milliLiter(s) Oral two times a day PRN Constipation  oxyCODONE    IR 5 milliGRAM(s) Oral every 3 hours PRN Moderate to Severe Pain (4-10)  oxyCODONE    IR 5 milliGRAM(s) Oral once PRN Moderate to Severe Pain (4-10)  pramoxine 1%/zinc 5% Cream 1 Application(s) Topical every 4 hours PRN Moderate Pain (4-6)  simethicone 80 milliGRAM(s) Chew every 4 hours PRN Gas  witch hazel Pads 1 Application(s) Topical every 4 hours PRN Perineal discomfort

## 2020-08-26 NOTE — PROGRESS NOTE ADULT - SUBJECTIVE AND OBJECTIVE BOX
PPD#2- ATTENDING NOTE    S: Patient has neck pain which is not alleviated with recumbent positioning. She was seen by anesthesia but declined a blood patch. She feels tension in her neck, like a spasm. Minimal lochia.Bottlefeeding.    O: Vital Signs Last 24 Hrs  T(C): 36.9 (25 Aug 2020 17:19), Max: 36.9 (25 Aug 2020 17:19)  T(F): 98.4 (25 Aug 2020 17:19), Max: 98.4 (25 Aug 2020 17:19)  HR: 76 (25 Aug 2020 17:19) (76 - 83)  BP: 118/78 (25 Aug 2020 17:19) (118/78 - 125/82)  BP(mean): --  RR: 18 (25 Aug 2020 17:19) (18 - 18)  SpO2: 98% (25 Aug 2020 17:19) (98% - 98%)    Gen: NAD  Abd: soft, NT, ND, fundus firm below umbilicus  Lochia: moderate  Ext: no tenderness, no hyper reflexia    Labs:                        8.0    24.51 )-----------( 246      ( 24 Aug 2020 23:50 )             24.8       A: 35y PPD#1 s/p  doing well.    Plan:  Analgesia prn  Regular diet  Discharge instruction given  F/U 6 weeks      Office [phone]  Dr. Méndez PPD#2- ATTENDING NOTE    S: Patient has neck pain which is not alleviated with recumbent positioning. She was seen by anesthesia but declined a blood patch. She feels tension in her neck, like a spasm. Minimal lochia.Bottlefeeding.    O: Vital Signs Last 24 Hrs  T(C): 36.9 (25 Aug 2020 17:19), Max: 36.9 (25 Aug 2020 17:19)  T(F): 98.4 (25 Aug 2020 17:19), Max: 98.4 (25 Aug 2020 17:19)  HR: 76 (25 Aug 2020 17:19) (76 - 83)  BP: 118/78 (25 Aug 2020 17:19) (118/78 - 125/82)  BP(mean): --  RR: 18 (25 Aug 2020 17:19) (18 - 18)  SpO2: 98% (25 Aug 2020 17:19) (98% - 98%)    Gen: appear uncomfortable in supine position with a heat pack on her neck  Neck: Muscle spasm on right side of her neck and tenderness to palpation of the muscles around base of her neck  Abd: soft, NT, ND, fundus firm below umbilicus  Lochia: moderate  Ext: no tenderness, no hyper reflexia    Labs:                        8.0    24.51 )-----------( 246      ( 24 Aug 2020 23:50 )             24.8       A: 35y PPD#1 s/p  doing well.    Plan:  Analgesia prn  Regular diet  Discharge instruction given  F/U 6 weeks      Office [phone]  Dr. Méndez

## 2020-08-27 DIAGNOSIS — M54.2 CERVICALGIA: ICD-10-CM

## 2020-08-27 PROCEDURE — 99223 1ST HOSP IP/OBS HIGH 75: CPT

## 2020-08-27 PROCEDURE — 72125 CT NECK SPINE W/O DYE: CPT | Mod: 26

## 2020-08-27 PROCEDURE — 70450 CT HEAD/BRAIN W/O DYE: CPT | Mod: 26

## 2020-08-27 RX ORDER — CYCLOBENZAPRINE HYDROCHLORIDE 10 MG/1
10 TABLET, FILM COATED ORAL THREE TIMES A DAY
Refills: 0 | Status: DISCONTINUED | OUTPATIENT
Start: 2020-08-27 | End: 2020-08-28

## 2020-08-27 RX ADMIN — Medication 600 MILLIGRAM(S): at 18:45

## 2020-08-27 RX ADMIN — OXYCODONE HYDROCHLORIDE 5 MILLIGRAM(S): 5 TABLET ORAL at 05:00

## 2020-08-27 RX ADMIN — Medication 600 MILLIGRAM(S): at 12:25

## 2020-08-27 RX ADMIN — Medication 975 MILLIGRAM(S): at 02:02

## 2020-08-27 RX ADMIN — Medication 500 MILLIGRAM(S): at 11:53

## 2020-08-27 RX ADMIN — Medication 975 MILLIGRAM(S): at 15:50

## 2020-08-27 RX ADMIN — Medication 600 MILLIGRAM(S): at 05:02

## 2020-08-27 RX ADMIN — OXYCODONE HYDROCHLORIDE 5 MILLIGRAM(S): 5 TABLET ORAL at 15:21

## 2020-08-27 RX ADMIN — Medication 975 MILLIGRAM(S): at 03:05

## 2020-08-27 RX ADMIN — Medication 600 MILLIGRAM(S): at 11:54

## 2020-08-27 RX ADMIN — Medication 1 TABLET(S): at 11:53

## 2020-08-27 RX ADMIN — CYCLOBENZAPRINE HYDROCHLORIDE 10 MILLIGRAM(S): 10 TABLET, FILM COATED ORAL at 21:21

## 2020-08-27 RX ADMIN — CYCLOBENZAPRINE HYDROCHLORIDE 10 MILLIGRAM(S): 10 TABLET, FILM COATED ORAL at 13:06

## 2020-08-27 RX ADMIN — Medication 600 MILLIGRAM(S): at 18:14

## 2020-08-27 RX ADMIN — SODIUM CHLORIDE 3 MILLILITER(S): 9 INJECTION INTRAMUSCULAR; INTRAVENOUS; SUBCUTANEOUS at 15:09

## 2020-08-27 RX ADMIN — OXYCODONE HYDROCHLORIDE 5 MILLIGRAM(S): 5 TABLET ORAL at 09:30

## 2020-08-27 RX ADMIN — SODIUM CHLORIDE 3 MILLILITER(S): 9 INJECTION INTRAMUSCULAR; INTRAVENOUS; SUBCUTANEOUS at 06:44

## 2020-08-27 RX ADMIN — Medication 600 MILLIGRAM(S): at 06:45

## 2020-08-27 RX ADMIN — Medication 975 MILLIGRAM(S): at 08:56

## 2020-08-27 RX ADMIN — Medication 975 MILLIGRAM(S): at 22:20

## 2020-08-27 RX ADMIN — SODIUM CHLORIDE 3 MILLILITER(S): 9 INJECTION INTRAMUSCULAR; INTRAVENOUS; SUBCUTANEOUS at 21:14

## 2020-08-27 RX ADMIN — Medication 975 MILLIGRAM(S): at 21:18

## 2020-08-27 RX ADMIN — OXYCODONE HYDROCHLORIDE 5 MILLIGRAM(S): 5 TABLET ORAL at 08:56

## 2020-08-27 RX ADMIN — Medication 975 MILLIGRAM(S): at 09:30

## 2020-08-27 RX ADMIN — Medication 975 MILLIGRAM(S): at 15:20

## 2020-08-27 RX ADMIN — Medication 975 MILLIGRAM(S): at 02:03

## 2020-08-27 RX ADMIN — Medication 325 MILLIGRAM(S): at 05:03

## 2020-08-27 RX ADMIN — Medication 325 MILLIGRAM(S): at 21:20

## 2020-08-27 RX ADMIN — OXYCODONE HYDROCHLORIDE 5 MILLIGRAM(S): 5 TABLET ORAL at 15:55

## 2020-08-27 NOTE — PROGRESS NOTE ADULT - PROBLEM SELECTOR PLAN 2
-vital signs stable, no acute neurological deficits   -neurology consulted-suggested IV tylenol, oral prednisolone, & to obtain CT head/cervical spine  -tylenol & prednisone did not help pain  -IV tylenol does not cover pain, prednisolone ordered  -f/u CT Head & Neck    Miguel Ibarra, PGY1

## 2020-08-27 NOTE — CHART NOTE - NSCHARTNOTEFT_GEN_A_CORE
pt s/p blood patch  headache resolved  neck pain still present - but much improved  cont current management  will cont to monitor

## 2020-08-27 NOTE — PRE-ANESTHESIA EVALUATION ADULT - NSANTHPMHFT_GEN_ALL_CORE
Currently POD 3 s/p . Patient reports worsening 8/10 neck and lower skull pain 2 hours following epidural placement. headache has not improved, and now more positiona. seen by neurology who have low suspicion of other etiologies. CT scan of head and neck showed intradural air. no csf leak noted. compared to yesterday (see progress note ) Currently POD 3 s/p . Patient reports worsening 8/10 neck and lower skull pain 2 hours following epidural placement. headache has not improved, and now more positional. seen by neurology who have low suspicion of other etiologies. CT scan of head and neck showed intradural air. no csf leak noted. see progress note ).

## 2020-08-27 NOTE — PROVIDER CONTACT NOTE (OTHER) - ACTION/TREATMENT ORDERED:
Pt to have Cat scan of Head and cervical spine (ordered). Motrin given as ordered and Oxycodone given.

## 2020-08-27 NOTE — PRE-ANESTHESIA EVALUATION ADULT - NSANTHOSAYNRD_GEN_A_CORE
No. NICK screening performed.  STOP BANG Legend: 0-2 = LOW Risk; 3-4 = INTERMEDIATE Risk; 5-8 = HIGH Risk
No. NICK screening performed.  STOP BANG Legend: 0-2 = LOW Risk; 3-4 = INTERMEDIATE Risk; 5-8 = HIGH Risk

## 2020-08-27 NOTE — PROGRESS NOTE ADULT - SUBJECTIVE AND OBJECTIVE BOX
Patient seen and examined at bedside. Pt c/o severe neck and head pain that has not improved with any pain medication. Pt able to move neck, and denies any neuro deficits. Neuro is aware. CT scan head & cspine ordered. Patient is ambulating, voiding spontaneously, passing gas, and tolerating regular diet. Denies CP, SOB, N/V, blurred vision, epigastric pain.    Vital Signs Last 24 Hours  T(C): 36.3 (08-27-20 @ 05:00), Max: 37.1 (08-26-20 @ 15:09)  HR: 80 (08-27-20 @ 05:00) (78 - 81)  BP: 145/81 (08-27-20 @ 05:00) (121/76 - 145/81)  RR: 18 (08-27-20 @ 05:00) (18 - 18)  SpO2: 97% (08-26-20 @ 23:40) (94% - 97%)    Physical Exam:  General: NAD  Abdomen: Soft, non-tender, non-distended, fundus firm  Neck: tender to palpation, ROM wnl  Pelvic: Lochia wnl    Labs:    Blood Type: AB Positive  Antibody Screen: Negative  RPR: Negative               8.0    24.51 )-----------( 246      ( 08-24 @ 23:50 )             24.8                9.7    16.41 )-----------( 287      ( 08-24 @ 20:16 )             30.3                10.4   13.01 )-----------( 260      ( 08-24 @ 05:37 )             32.8         MEDICATIONS  (STANDING):  acetaminophen   Tablet .. 975 milliGRAM(s) Oral <User Schedule>  ascorbic acid 500 milliGRAM(s) Oral daily  diphtheria/tetanus/pertussis (acellular) Vaccine (ADAcel) 0.5 milliLiter(s) IntraMuscular once  ferrous    sulfate 325 milliGRAM(s) Oral three times a day  ibuprofen  Tablet. 600 milliGRAM(s) Oral every 6 hours  oxytocin Infusion 333.333 milliUNIT(s)/Min (1000 mL/Hr) IV Continuous <Continuous>  oxytocin Infusion 4 milliUNIT(s)/Min (4 mL/Hr) IV Continuous <Continuous>  prenatal multivitamin 1 Tablet(s) Oral daily  sodium chloride 0.9% lock flush 3 milliLiter(s) IV Push every 8 hours  sodium chloride 0.9%. 1000 milliLiter(s) (150 mL/Hr) IV Continuous <Continuous>    MEDICATIONS  (PRN):  benzocaine 20%/menthol 0.5% Spray 1 Spray(s) Topical every 6 hours PRN for Perineal discomfort  dibucaine 1% Ointment 1 Application(s) Topical every 6 hours PRN Perineal discomfort  diphenhydrAMINE 25 milliGRAM(s) Oral every 6 hours PRN Pruritus  hydrocortisone 1% Cream 1 Application(s) Topical every 6 hours PRN Moderate Pain (4-6)  lanolin Ointment 1 Application(s) Topical every 6 hours PRN nipple soreness  magnesium hydroxide Suspension 30 milliLiter(s) Oral two times a day PRN Constipation  oxyCODONE    IR 5 milliGRAM(s) Oral every 3 hours PRN Moderate to Severe Pain (4-10)  oxyCODONE    IR 5 milliGRAM(s) Oral once PRN Moderate to Severe Pain (4-10)  pramoxine 1%/zinc 5% Cream 1 Application(s) Topical every 4 hours PRN Moderate Pain (4-6)  simethicone 80 milliGRAM(s) Chew every 4 hours PRN Gas  witch hazel Pads 1 Application(s) Topical every 4 hours PRN Perineal discomfort

## 2020-08-27 NOTE — PROGRESS NOTE ADULT - SUBJECTIVE AND OBJECTIVE BOX
OB Attending Note    S: Patient reports continued neck pain and trouble moving left and right - also neck pain with movement standing,  no headache  no muscle weakness or tingling, localized to neck    O: Vital Signs Last 24 Hrs  T(C): 37 (27 Aug 2020 09:00), Max: 37.1 (26 Aug 2020 15:09)  T(F): 98.6 (27 Aug 2020 09:00), Max: 98.8 (26 Aug 2020 15:09)  HR: 70 (27 Aug 2020 09:00) (70 - 81)  BP: 130/78 (27 Aug 2020 09:00) (121/76 - 145/81)  BP(mean): --  RR: 18 (27 Aug 2020 09:00) (18 - 18)  SpO2: 99% (27 Aug 2020 09:00) (94% - 99%)    Gen: NAD  limited movement with SCM movement  Abd: soft, NT, ND, fundus firm below umbilicus  Lochia: min  Perineum healing well  Ext: no tenderness    Labs:      A: 35y PPD# s/p    CT read with intradural tear read  area of C1 of interest - will review with Neurology     Plan:   could be nerve/muscle-skeletal  flexeril tid  NSAIDs  consider PT as well  will f/u with Neurology

## 2020-08-27 NOTE — PROVIDER CONTACT NOTE (OTHER) - ASSESSMENT
pt co increasing neck pain, IVtylenol given at 2025, po tylenol given at 0202 and Motrin given at 2200, Prednisone 10 mg PO given at 0002. Pain increases upon movement.

## 2020-08-27 NOTE — PRE-ANESTHESIA EVALUATION ADULT - NSANTHPEFT_GEN_ALL_CORE
General: well appearing female, appears stated age  Cardiovascular: RRR, no murmurs appreciated  Respiratory: CTA B/L  neuraxial site: multiple incision sites noted. no bruising or signs of infection

## 2020-08-28 VITALS
DIASTOLIC BLOOD PRESSURE: 83 MMHG | TEMPERATURE: 98 F | RESPIRATION RATE: 18 BRPM | SYSTOLIC BLOOD PRESSURE: 126 MMHG | HEART RATE: 78 BPM

## 2020-08-28 PROCEDURE — 80053 COMPREHEN METABOLIC PANEL: CPT

## 2020-08-28 PROCEDURE — 70450 CT HEAD/BRAIN W/O DYE: CPT

## 2020-08-28 PROCEDURE — 86780 TREPONEMA PALLIDUM: CPT

## 2020-08-28 PROCEDURE — 85384 FIBRINOGEN ACTIVITY: CPT

## 2020-08-28 PROCEDURE — 83615 LACTATE (LD) (LDH) ENZYME: CPT

## 2020-08-28 PROCEDURE — G0463: CPT

## 2020-08-28 PROCEDURE — 86900 BLOOD TYPING SEROLOGIC ABO: CPT

## 2020-08-28 PROCEDURE — 85730 THROMBOPLASTIN TIME PARTIAL: CPT

## 2020-08-28 PROCEDURE — 84550 ASSAY OF BLOOD/URIC ACID: CPT

## 2020-08-28 PROCEDURE — 86769 SARS-COV-2 COVID-19 ANTIBODY: CPT

## 2020-08-28 PROCEDURE — 85027 COMPLETE CBC AUTOMATED: CPT

## 2020-08-28 PROCEDURE — 99231 SBSQ HOSP IP/OBS SF/LOW 25: CPT

## 2020-08-28 PROCEDURE — 59025 FETAL NON-STRESS TEST: CPT

## 2020-08-28 PROCEDURE — 88307 TISSUE EXAM BY PATHOLOGIST: CPT

## 2020-08-28 PROCEDURE — 85610 PROTHROMBIN TIME: CPT

## 2020-08-28 PROCEDURE — 86901 BLOOD TYPING SEROLOGIC RH(D): CPT

## 2020-08-28 PROCEDURE — 86850 RBC ANTIBODY SCREEN: CPT

## 2020-08-28 PROCEDURE — 72125 CT NECK SPINE W/O DYE: CPT

## 2020-08-28 PROCEDURE — 59050 FETAL MONITOR W/REPORT: CPT

## 2020-08-28 RX ORDER — FERROUS SULFATE 325(65) MG
1 TABLET ORAL
Qty: 0 | Refills: 0 | DISCHARGE
Start: 2020-08-28

## 2020-08-28 RX ORDER — IBUPROFEN 200 MG
1 TABLET ORAL
Qty: 0 | Refills: 0 | DISCHARGE
Start: 2020-08-28

## 2020-08-28 RX ORDER — CYCLOBENZAPRINE HYDROCHLORIDE 10 MG/1
1 TABLET, FILM COATED ORAL
Qty: 0 | Refills: 0 | DISCHARGE
Start: 2020-08-28

## 2020-08-28 RX ADMIN — CYCLOBENZAPRINE HYDROCHLORIDE 10 MILLIGRAM(S): 10 TABLET, FILM COATED ORAL at 05:29

## 2020-08-28 RX ADMIN — Medication 600 MILLIGRAM(S): at 05:30

## 2020-08-28 RX ADMIN — Medication 600 MILLIGRAM(S): at 12:31

## 2020-08-28 RX ADMIN — Medication 325 MILLIGRAM(S): at 05:29

## 2020-08-28 RX ADMIN — Medication 600 MILLIGRAM(S): at 06:30

## 2020-08-28 NOTE — PROGRESS NOTE ADULT - PROBLEM SELECTOR PROBLEM 1
Vaginal delivery

## 2020-08-28 NOTE — PROGRESS NOTE ADULT - SUBJECTIVE AND OBJECTIVE BOX
Subjective:  got blood patch yesterday. neck pain resolved      Objective:   Vital Signs Last 24 Hrs  T(C): 36.7 (28 Aug 2020 05:34), Max: 36.8 (27 Aug 2020 14:40)  T(F): 98.1 (28 Aug 2020 05:34), Max: 98.3 (27 Aug 2020 14:40)  HR: 78 (28 Aug 2020 05:34) (75 - 84)  BP: 126/83 (28 Aug 2020 05:34) (122/74 - 126/83)  BP(mean): --  RR: 18 (28 Aug 2020 05:34) (18 - 19)  SpO2: 98% (27 Aug 2020 17:29) (98% - 98%)    Awake and alert, in no acute distress  Fluent, comprehension intact, able to provide history  Attends to both sides. Conjugate gaze without directional limitation. No dysarthria. Face symmetric  Moves extremities symmetrically  No dysmetria with reaching for objects    supine at an angle 0/10 pain, sitting upright 0/10 pain    Radiology  < from: CT Head No Cont (08.27.20 @ 10:19) >  There is a typographical area in the body of the cervical spine report. It should read: There also appears to be a small amount of intradural air in the C1-2 level ventrally.    *** END OF ADDENDUM 08/27/2020  ***      PROCEDURE DATE:  08/27/2020        INTERPRETATION:  CLINICAL INDICATION: Postop day 1 from normal spontaneous vaginal delivery, headache and neck pain    Brain CT:    5mm axial sections of the brain were obtained from base to vertex, without the intravenous administration of contrast material. Coronal and sagittal computer generated reconstructed views are available.    No prior brain imaging is available for comparison    The fourth, third and lateral ventricles are normal size and position. There is no hemorrhage, mass or shift of the midline structures. There is normal gray white matter differentiation. Bone window examination is unremarkable.    IMPRESSION: Unremarkable noncontrast CT of the brain. No hemorrhage or abnormal lucencies to suggest hypertensive encephalopathy.      Cervical spine CT:    Serial thin sections on a multi slice scanner were obtained through the cervical spine from the C1 to the T2 level in a stacked axial fashion reformatted at 1.25 mm sections with sagittal and coronal computer generated reconstructed views.    There is normal alignment of the vertebral bodies and facet joints. The cervical vertebrae are normal in height and density. There is mild reversal the normal cervical curvature.    No acute fractures or dislocations are identified. There is epidural air identified in the cervical spine likely related to the epidural catheter for delivery. There also appears to be a small amount of intradural tear at the C1 to level ventrally. This is also likely related to the recent epidural catheter.      IMPRESSION: Reversal the normal cervical curvature. Small amount of dorsal epidural and ventral intraspinal air likely related to recent epidural catheter for delivery.    ***Please see the addendum at the top of this report. It may contain additional important information or changes.****    JIMI PADILLA MD, ATTENDING RADIOLOGIST  This document has been electronically signed. Aug 27 2020 10:54AM  Addend:  JIMI PADILLA MD, ATTENDING RADIOLOGIST  This addendum was electronically signed on: Aug 27 2020 11:35AM.    < end of copied text >    MEDICATIONS  (STANDING):  acetaminophen   Tablet .. 975 milliGRAM(s) Oral <User Schedule>  ascorbic acid 500 milliGRAM(s) Oral daily  cyclobenzaprine 10 milliGRAM(s) Oral three times a day  diphtheria/tetanus/pertussis (acellular) Vaccine (ADAcel) 0.5 milliLiter(s) IntraMuscular once  ferrous    sulfate 325 milliGRAM(s) Oral three times a day  ibuprofen  Tablet. 600 milliGRAM(s) Oral every 6 hours  oxytocin Infusion 333.333 milliUNIT(s)/Min (1000 mL/Hr) IV Continuous <Continuous>  oxytocin Infusion 4 milliUNIT(s)/Min (4 mL/Hr) IV Continuous <Continuous>  prenatal multivitamin 1 Tablet(s) Oral daily  sodium chloride 0.9% lock flush 3 milliLiter(s) IV Push every 8 hours  sodium chloride 0.9%. 1000 milliLiter(s) (150 mL/Hr) IV Continuous <Continuous>    MEDICATIONS  (PRN):  benzocaine 20%/menthol 0.5% Spray 1 Spray(s) Topical every 6 hours PRN for Perineal discomfort  dibucaine 1% Ointment 1 Application(s) Topical every 6 hours PRN Perineal discomfort  diphenhydrAMINE 25 milliGRAM(s) Oral every 6 hours PRN Pruritus  hydrocortisone 1% Cream 1 Application(s) Topical every 6 hours PRN Moderate Pain (4-6)  lanolin Ointment 1 Application(s) Topical every 6 hours PRN nipple soreness  magnesium hydroxide Suspension 30 milliLiter(s) Oral two times a day PRN Constipation  oxyCODONE    IR 5 milliGRAM(s) Oral every 3 hours PRN Moderate to Severe Pain (4-10)  oxyCODONE    IR 5 milliGRAM(s) Oral once PRN Moderate to Severe Pain (4-10)  pramoxine 1%/zinc 5% Cream 1 Application(s) Topical every 4 hours PRN Moderate Pain (4-6)  simethicone 80 milliGRAM(s) Chew every 4 hours PRN Gas  witch hazel Pads 1 Application(s) Topical every 4 hours PRN Perineal discomfort

## 2020-08-28 NOTE — PROGRESS NOTE ADULT - ASSESSMENT
35F with positional neck pain due to intracranial hypotension s/p epidural anesthesia for her delivery.  resolved s/p blood patch  pls reconsult with new questions
A/P: 36yo PPD#1 s/p .  Patient is stable and doing well post-partum. Uterus firm.
Plan: Anemia secondary to acute blood loss due to delivery.   - Pt cleared to go to post partum floor  - [facility] to stay in until AM  - Invanz 1000mg IVPB x1 dose ordered  - Monitor for signs/symptoms of anemia.  No transfusion needed at this time.  - Neck pain: continue to monitor, anesthesia saw pt at bedside said likely not secondary to spinal headache  - Pt seen and evaluated with Dr. Pereira, to be d/c to postpartum floor per routine    Bonita Valerio PA-C
Pt is a 34yo  PPD2 from  in stable condition.
Pt is a 34yo  PPD4 from  s/p spinal headache resolved with blood patch.
Pt is a 34yo  PPD3 from [facility] complaining of significant neck pain.

## 2020-08-28 NOTE — PROGRESS NOTE ADULT - SUBJECTIVE AND OBJECTIVE BOX
Patient seen and examined at bedside, no acute overnight events. Pts HA & neck pain has resolved s/p blood patch. No acute complaints, pain well controlled. Patient is ambulating, voiding spontaneously, passing gas, and tolerating regular diet. Denies CP, SOB, N/V, HA, blurred vision, epigastric pain.    Vital Signs Last 24 Hours  T(C): 36.7 (08-28-20 @ 05:34), Max: 37 (08-27-20 @ 09:00)  HR: 78 (08-28-20 @ 05:34) (70 - 84)  BP: 126/83 (08-28-20 @ 05:34) (122/74 - 130/78)  RR: 18 (08-28-20 @ 05:34) (18 - 19)  SpO2: 98% (08-27-20 @ 17:29) (98% - 99%)    Physical Exam:  General: NAD  Abdomen: Soft, non-tender, non-distended, fundus firm  Pelvic: Lochia wnl    Labs:    Blood Type: AB Positive  Antibody Screen: Negative  RPR: Negative               8.0    24.51 )-----------( 246      ( 08-24 @ 23:50 )             24.8                9.7    16.41 )-----------( 287      ( 08-24 @ 20:16 )             30.3                10.4   13.01 )-----------( 260      ( 08-24 @ 05:37 )             32.8         MEDICATIONS  (STANDING):  acetaminophen   Tablet .. 975 milliGRAM(s) Oral <User Schedule>  ascorbic acid 500 milliGRAM(s) Oral daily  cyclobenzaprine 10 milliGRAM(s) Oral three times a day  diphtheria/tetanus/pertussis (acellular) Vaccine (ADAcel) 0.5 milliLiter(s) IntraMuscular once  ferrous    sulfate 325 milliGRAM(s) Oral three times a day  ibuprofen  Tablet. 600 milliGRAM(s) Oral every 6 hours  oxytocin Infusion 333.333 milliUNIT(s)/Min (1000 mL/Hr) IV Continuous <Continuous>  oxytocin Infusion 4 milliUNIT(s)/Min (4 mL/Hr) IV Continuous <Continuous>  prenatal multivitamin 1 Tablet(s) Oral daily  sodium chloride 0.9% lock flush 3 milliLiter(s) IV Push every 8 hours  sodium chloride 0.9%. 1000 milliLiter(s) (150 mL/Hr) IV Continuous <Continuous>    MEDICATIONS  (PRN):  benzocaine 20%/menthol 0.5% Spray 1 Spray(s) Topical every 6 hours PRN for Perineal discomfort  dibucaine 1% Ointment 1 Application(s) Topical every 6 hours PRN Perineal discomfort  diphenhydrAMINE 25 milliGRAM(s) Oral every 6 hours PRN Pruritus  hydrocortisone 1% Cream 1 Application(s) Topical every 6 hours PRN Moderate Pain (4-6)  lanolin Ointment 1 Application(s) Topical every 6 hours PRN nipple soreness  magnesium hydroxide Suspension 30 milliLiter(s) Oral two times a day PRN Constipation  oxyCODONE    IR 5 milliGRAM(s) Oral every 3 hours PRN Moderate to Severe Pain (4-10)  oxyCODONE    IR 5 milliGRAM(s) Oral once PRN Moderate to Severe Pain (4-10)  pramoxine 1%/zinc 5% Cream 1 Application(s) Topical every 4 hours PRN Moderate Pain (4-6)  simethicone 80 milliGRAM(s) Chew every 4 hours PRN Gas  witch hazel Pads 1 Application(s) Topical every 4 hours PRN Perineal discomfort

## 2020-08-28 NOTE — PROGRESS NOTE ADULT - PROBLEM SELECTOR PLAN 1
35y PPD#2 s/p  with neck pain    Plan:  Analgesia prn, will try flexeril because the PE findings is suggestive of possible muscle spasm. If no improvement will have anesthesia reconsult for a blood patch  Regular diet  Will assess discharge after flexeril  Jacinta Méndez MD
- Continue with po analgesia  - Increase ambulation  - Continue regular diet  - IV lock  - No labs
D/c home  Analgesia prn, continue flexeril  Regular diet  Discharge instruction given  F/U 6 weeks
- Continue with po analgesia  - Increase ambulation  - Continue regular diet  - IV lock  - No labs    Miguel Ibarra, PGY1
- Continue with po analgesia  - Increase ambulation  - Continue regular diet  - IV lock  - No labs    Miguel Ibarra, PGY1
- Pain well controlled, continue current pain regimen  - Increase ambulation, SCDs when not ambulating  - Continue regular diet    Amrit Child, PGY1

## 2020-08-28 NOTE — PROGRESS NOTE ADULT - SUBJECTIVE AND OBJECTIVE BOX
PPD#4- ATTENDING NOTE    S: Patient doing well. Minimal lochia. Pain controlled. HA is gone after blood patch    O: Vital Signs Last 24 Hrs  T(C): 36.7 (28 Aug 2020 05:34), Max: 36.8 (27 Aug 2020 14:40)  T(F): 98.1 (28 Aug 2020 05:34), Max: 98.3 (27 Aug 2020 14:40)  HR: 78 (28 Aug 2020 05:34) (75 - 84)  BP: 126/83 (28 Aug 2020 05:34) (122/74 - 126/83)  BP(mean): --  RR: 18 (28 Aug 2020 05:34) (18 - 19)  SpO2: 98% (27 Aug 2020 17:29) (98% - 98%)    Gen: NAD  Abd: soft, NT, ND, fundus firm below umbilicus  Lochia: moderate  Ext: no tenderness, no hyper reflexia    Labs:      A: 35y PPD# 4 s/p  doing well after blood patch    Plan:      Office [phone]  Dr. Méndez

## 2020-08-29 ENCOUNTER — INPATIENT (INPATIENT)
Facility: HOSPITAL | Age: 35
LOS: 2 days | Discharge: HOME | DRG: 776 | End: 2020-09-01
Attending: OBSTETRICS & GYNECOLOGY | Admitting: OBSTETRICS & GYNECOLOGY
Payer: COMMERCIAL

## 2020-08-29 VITALS — HEART RATE: 76 BPM | OXYGEN SATURATION: 97 % | TEMPERATURE: 99 F

## 2020-08-29 DIAGNOSIS — O14.10 SEVERE PRE-ECLAMPSIA, UNSPECIFIED TRIMESTER: ICD-10-CM

## 2020-08-29 RX ORDER — ACETAMINOPHEN 500 MG
975 TABLET ORAL ONCE
Refills: 0 | Status: COMPLETED | OUTPATIENT
Start: 2020-08-29 | End: 2020-08-29

## 2020-08-29 RX ORDER — MAGNESIUM SULFATE 500 MG/ML
1 VIAL (ML) INJECTION ONCE
Refills: 0 | Status: COMPLETED | OUTPATIENT
Start: 2020-08-29 | End: 2020-08-30

## 2020-08-29 RX ADMIN — Medication 975 MILLIGRAM(S): at 21:49

## 2020-08-30 DIAGNOSIS — Z98.890 OTHER SPECIFIED POSTPROCEDURAL STATES: Chronic | ICD-10-CM

## 2020-08-30 DIAGNOSIS — Z90.79 ACQUIRED ABSENCE OF OTHER GENITAL ORGAN(S): Chronic | ICD-10-CM

## 2020-08-30 DIAGNOSIS — M67.911 UNSPECIFIED DISORDER OF SYNOVIUM AND TENDON, RIGHT SHOULDER: Chronic | ICD-10-CM

## 2020-08-30 DIAGNOSIS — Z90.49 ACQUIRED ABSENCE OF OTHER SPECIFIED PARTS OF DIGESTIVE TRACT: Chronic | ICD-10-CM

## 2020-08-30 LAB
ALBUMIN SERPL ELPH-MCNC: 3.4 G/DL — SIGNIFICANT CHANGE UP (ref 3.3–5)
ALP SERPL-CCNC: 117 U/L — SIGNIFICANT CHANGE UP (ref 40–120)
ALT FLD-CCNC: 30 U/L — SIGNIFICANT CHANGE UP (ref 10–45)
ANION GAP SERPL CALC-SCNC: 15 MMOL/L — SIGNIFICANT CHANGE UP (ref 5–17)
APTT BLD: 29.3 SEC — SIGNIFICANT CHANGE UP (ref 27.5–35.5)
AST SERPL-CCNC: 24 U/L — SIGNIFICANT CHANGE UP (ref 10–40)
BASOPHILS # BLD AUTO: 0.05 K/UL — SIGNIFICANT CHANGE UP (ref 0–0.2)
BASOPHILS NFR BLD AUTO: 0.5 % — SIGNIFICANT CHANGE UP (ref 0–2)
BILIRUB SERPL-MCNC: 0.1 MG/DL — LOW (ref 0.2–1.2)
BLD GP AB SCN SERPL QL: NEGATIVE — SIGNIFICANT CHANGE UP
BUN SERPL-MCNC: 8 MG/DL — SIGNIFICANT CHANGE UP (ref 7–23)
CALCIUM SERPL-MCNC: 9.2 MG/DL — SIGNIFICANT CHANGE UP (ref 8.4–10.5)
CHLORIDE SERPL-SCNC: 107 MMOL/L — SIGNIFICANT CHANGE UP (ref 96–108)
CO2 SERPL-SCNC: 20 MMOL/L — LOW (ref 22–31)
CREAT SERPL-MCNC: 0.63 MG/DL — SIGNIFICANT CHANGE UP (ref 0.5–1.3)
EOSINOPHIL # BLD AUTO: 0.31 K/UL — SIGNIFICANT CHANGE UP (ref 0–0.5)
EOSINOPHIL NFR BLD AUTO: 2.9 % — SIGNIFICANT CHANGE UP (ref 0–6)
FIBRINOGEN PPP-MCNC: 617 MG/DL — HIGH (ref 350–510)
GLUCOSE SERPL-MCNC: 95 MG/DL — SIGNIFICANT CHANGE UP (ref 70–99)
HCT VFR BLD CALC: 23.9 % — LOW (ref 34.5–45)
HGB BLD-MCNC: 7.4 G/DL — LOW (ref 11.5–15.5)
IMM GRANULOCYTES NFR BLD AUTO: 1.2 % — SIGNIFICANT CHANGE UP (ref 0–1.5)
INR BLD: 1.04 RATIO — SIGNIFICANT CHANGE UP (ref 0.88–1.16)
LDH SERPL L TO P-CCNC: 222 U/L — SIGNIFICANT CHANGE UP (ref 50–242)
LYMPHOCYTES # BLD AUTO: 3.22 K/UL — SIGNIFICANT CHANGE UP (ref 1–3.3)
LYMPHOCYTES # BLD AUTO: 30.5 % — SIGNIFICANT CHANGE UP (ref 13–44)
MCHC RBC-ENTMCNC: 26.8 PG — LOW (ref 27–34)
MCHC RBC-ENTMCNC: 31 GM/DL — LOW (ref 32–36)
MCV RBC AUTO: 86.6 FL — SIGNIFICANT CHANGE UP (ref 80–100)
MONOCYTES # BLD AUTO: 0.8 K/UL — SIGNIFICANT CHANGE UP (ref 0–0.9)
MONOCYTES NFR BLD AUTO: 7.6 % — SIGNIFICANT CHANGE UP (ref 2–14)
NEUTROPHILS # BLD AUTO: 6.04 K/UL — SIGNIFICANT CHANGE UP (ref 1.8–7.4)
NEUTROPHILS NFR BLD AUTO: 57.3 % — SIGNIFICANT CHANGE UP (ref 43–77)
NRBC # BLD: 0 /100 WBCS — SIGNIFICANT CHANGE UP (ref 0–0)
PLATELET # BLD AUTO: 466 K/UL — HIGH (ref 150–400)
POTASSIUM SERPL-MCNC: 4.2 MMOL/L — SIGNIFICANT CHANGE UP (ref 3.5–5.3)
POTASSIUM SERPL-SCNC: 4.2 MMOL/L — SIGNIFICANT CHANGE UP (ref 3.5–5.3)
PROT SERPL-MCNC: 6.2 G/DL — SIGNIFICANT CHANGE UP (ref 6–8.3)
PROTHROM AB SERPL-ACNC: 12.3 SEC — SIGNIFICANT CHANGE UP (ref 10.6–13.6)
RBC # BLD: 2.76 M/UL — LOW (ref 3.8–5.2)
RBC # FLD: 16.8 % — HIGH (ref 10.3–14.5)
RH IG SCN BLD-IMP: POSITIVE — SIGNIFICANT CHANGE UP
SARS-COV-2 RNA SPEC QL NAA+PROBE: SIGNIFICANT CHANGE UP
SODIUM SERPL-SCNC: 142 MMOL/L — SIGNIFICANT CHANGE UP (ref 135–145)
URATE SERPL-MCNC: 4 MG/DL — SIGNIFICANT CHANGE UP (ref 2.5–7)
WBC # BLD: 10.55 K/UL — HIGH (ref 3.8–10.5)
WBC # FLD AUTO: 10.55 K/UL — HIGH (ref 3.8–10.5)

## 2020-08-30 PROCEDURE — 99223 1ST HOSP IP/OBS HIGH 75: CPT

## 2020-08-30 PROCEDURE — 72141 MRI NECK SPINE W/O DYE: CPT | Mod: 26

## 2020-08-30 RX ORDER — HYDROCORTISONE 1 %
1 OINTMENT (GRAM) TOPICAL EVERY 6 HOURS
Refills: 0 | Status: DISCONTINUED | OUTPATIENT
Start: 2020-08-30 | End: 2020-09-01

## 2020-08-30 RX ORDER — OXYCODONE HYDROCHLORIDE 5 MG/1
5 TABLET ORAL EVERY 4 HOURS
Refills: 0 | Status: DISCONTINUED | OUTPATIENT
Start: 2020-08-30 | End: 2020-09-01

## 2020-08-30 RX ORDER — ACETAMINOPHEN 500 MG
1000 TABLET ORAL ONCE
Refills: 0 | Status: COMPLETED | OUTPATIENT
Start: 2020-08-30 | End: 2020-08-30

## 2020-08-30 RX ORDER — HEPARIN SODIUM 5000 [USP'U]/ML
5000 INJECTION INTRAVENOUS; SUBCUTANEOUS EVERY 12 HOURS
Refills: 0 | Status: DISCONTINUED | OUTPATIENT
Start: 2020-08-30 | End: 2020-09-01

## 2020-08-30 RX ORDER — DIPHENHYDRAMINE HCL 50 MG
25 CAPSULE ORAL ONCE
Refills: 0 | Status: COMPLETED | OUTPATIENT
Start: 2020-08-30 | End: 2020-08-30

## 2020-08-30 RX ORDER — MAGNESIUM HYDROXIDE 400 MG/1
30 TABLET, CHEWABLE ORAL
Refills: 0 | Status: DISCONTINUED | OUTPATIENT
Start: 2020-08-30 | End: 2020-09-01

## 2020-08-30 RX ORDER — FERROUS SULFATE 325(65) MG
325 TABLET ORAL THREE TIMES A DAY
Refills: 0 | Status: DISCONTINUED | OUTPATIENT
Start: 2020-08-30 | End: 2020-09-01

## 2020-08-30 RX ORDER — METOCLOPRAMIDE HCL 10 MG
10 TABLET ORAL ONCE
Refills: 0 | Status: COMPLETED | OUTPATIENT
Start: 2020-08-30 | End: 2020-08-30

## 2020-08-30 RX ORDER — DIBUCAINE 1 %
1 OINTMENT (GRAM) RECTAL EVERY 6 HOURS
Refills: 0 | Status: DISCONTINUED | OUTPATIENT
Start: 2020-08-30 | End: 2020-09-01

## 2020-08-30 RX ORDER — BENZOCAINE 10 %
1 GEL (GRAM) MUCOUS MEMBRANE EVERY 6 HOURS
Refills: 0 | Status: DISCONTINUED | OUTPATIENT
Start: 2020-08-30 | End: 2020-09-01

## 2020-08-30 RX ORDER — DIPHENHYDRAMINE HCL 50 MG
25 CAPSULE ORAL EVERY 6 HOURS
Refills: 0 | Status: DISCONTINUED | OUTPATIENT
Start: 2020-08-30 | End: 2020-09-01

## 2020-08-30 RX ORDER — AER TRAVELER 0.5 G/1
1 SOLUTION RECTAL; TOPICAL EVERY 4 HOURS
Refills: 0 | Status: DISCONTINUED | OUTPATIENT
Start: 2020-08-30 | End: 2020-09-01

## 2020-08-30 RX ORDER — SIMETHICONE 80 MG/1
80 TABLET, CHEWABLE ORAL EVERY 4 HOURS
Refills: 0 | Status: DISCONTINUED | OUTPATIENT
Start: 2020-08-30 | End: 2020-09-01

## 2020-08-30 RX ORDER — ACETAMINOPHEN 500 MG
975 TABLET ORAL EVERY 6 HOURS
Refills: 0 | Status: DISCONTINUED | OUTPATIENT
Start: 2020-08-30 | End: 2020-09-01

## 2020-08-30 RX ORDER — ASCORBIC ACID 60 MG
500 TABLET,CHEWABLE ORAL THREE TIMES A DAY
Refills: 0 | Status: DISCONTINUED | OUTPATIENT
Start: 2020-08-30 | End: 2020-09-01

## 2020-08-30 RX ORDER — SENNA PLUS 8.6 MG/1
2 TABLET ORAL AT BEDTIME
Refills: 0 | Status: DISCONTINUED | OUTPATIENT
Start: 2020-08-30 | End: 2020-09-01

## 2020-08-30 RX ORDER — KETOROLAC TROMETHAMINE 30 MG/ML
30 SYRINGE (ML) INJECTION ONCE
Refills: 0 | Status: COMPLETED | OUTPATIENT
Start: 2020-08-30 | End: 2020-08-30

## 2020-08-30 RX ORDER — SODIUM CHLORIDE 9 MG/ML
1000 INJECTION, SOLUTION INTRAVENOUS
Refills: 0 | Status: DISCONTINUED | OUTPATIENT
Start: 2020-08-30 | End: 2020-09-01

## 2020-08-30 RX ORDER — PRAMOXINE HYDROCHLORIDE 150 MG/15G
1 AEROSOL, FOAM RECTAL EVERY 4 HOURS
Refills: 0 | Status: DISCONTINUED | OUTPATIENT
Start: 2020-08-30 | End: 2020-09-01

## 2020-08-30 RX ORDER — LANOLIN
1 OINTMENT (GRAM) TOPICAL EVERY 6 HOURS
Refills: 0 | Status: DISCONTINUED | OUTPATIENT
Start: 2020-08-30 | End: 2020-09-01

## 2020-08-30 RX ADMIN — Medication 1000 MILLIGRAM(S): at 13:35

## 2020-08-30 RX ADMIN — OXYCODONE HYDROCHLORIDE 5 MILLIGRAM(S): 5 TABLET ORAL at 22:16

## 2020-08-30 RX ADMIN — Medication 400 MILLIGRAM(S): at 11:50

## 2020-08-30 RX ADMIN — HEPARIN SODIUM 5000 UNIT(S): 5000 INJECTION INTRAVENOUS; SUBCUTANEOUS at 16:33

## 2020-08-30 RX ADMIN — Medication 400 MILLIGRAM(S): at 04:11

## 2020-08-30 RX ADMIN — Medication 975 MILLIGRAM(S): at 22:16

## 2020-08-30 RX ADMIN — Medication 975 MILLIGRAM(S): at 22:46

## 2020-08-30 RX ADMIN — Medication 100 GRAM(S): at 00:15

## 2020-08-30 RX ADMIN — Medication 500 MILLIGRAM(S): at 08:19

## 2020-08-30 RX ADMIN — Medication 25 MILLIGRAM(S): at 12:44

## 2020-08-30 RX ADMIN — Medication 125 MILLIGRAM(S): at 16:31

## 2020-08-30 RX ADMIN — Medication 10 MILLIGRAM(S): at 12:43

## 2020-08-30 RX ADMIN — Medication 325 MILLIGRAM(S): at 16:32

## 2020-08-30 RX ADMIN — Medication 325 MILLIGRAM(S): at 08:19

## 2020-08-30 RX ADMIN — Medication 500 MILLIGRAM(S): at 16:33

## 2020-08-30 RX ADMIN — OXYCODONE HYDROCHLORIDE 5 MILLIGRAM(S): 5 TABLET ORAL at 22:46

## 2020-08-30 NOTE — CONSULT NOTE ADULT - SUBJECTIVE AND OBJECTIVE BOX
Gyn Consult Note  GIO IRBY  35y  Female [id number]    36y/o  now PPD5 s/p  c/b postpartum spinal headache (s/p blood patch on ) presenting with recurrent headache. Ms. Irby describes pain at the base of her scalp radiating down towards the middle of her neck. She states the pain is worse when sitting upright, standing or moving. She states it is significantly improved while laying still on her back. She denies any neurological sequelae - including AMS, numbness, tingling or weakness in her extremities. She denies light sensitivity, vision changes. Denies fevers, chest pain, shortness of breath, lightheadedness or dizziness.     Notes that this the same pain she felt postpartum (prior to the blood patch). States that after the blood patch on Thursday, the pain had completely resolved. She discharged home on Friday. However, this morning since waking up it has once again progressively worsened.     OBHx: sAB x3, ectopic x3 (s/p MTX x3),  x2    GYNHx: H/o ovarian cysts s/p bilateral laparoscopic cystectomy x2, s/p laparoscopic right salpingectomy   PMHx: Denies  PSHx: Shoulder surgery, Lsc cholecystectomy, lsc right salpingectomy, lsc bilateral ovarian cystectomy x2   Meds: Acetaminophen PRN   Allergies: Denies    Vital Signs Last 24 Hrs  T(C): 37.3 (29 Aug 2020 17:26), Max: 37.3 (29 Aug 2020 17:26)  T(F): 99.1 (29 Aug 2020 17:26), Max: 99.1 (29 Aug 2020 17:26)  HR: 68 (29 Aug 2020 18:10) (68 - 76)  BP: 130/76 (29 Aug 2020 18:10) (128/79 - 132/77)  BP(mean): 98 (29 Aug 2020 18:10) (97 - 100)  SpO2: 99% (29 Aug 2020 18:10) (97% - 99%)    Physical Exam:   General: sitting comfortably in bed, NAD   CV: RRR  Lungs: CTAB  Abd: Soft, non-tender, non-distended.    : Lochia wnl
*************************************  NEUROLOGY CONSULT  SERVICE  **************************************    GIO IRBY  Female  MRN-[id number]    HPI:  34 yo woman with PMH migraine headaches and recent neurology evaluation during recent admission for post-partum headache brought back to the hospital for evaluation of recurrent neck pain. Patient previously seen for headache and neck pain described as a sharp, throbbing pain that starts in the back of the neck and radiates into the upper shoulder which is exacerbated by neck movement or sitting or standing up, with gradual improvement upon lying flat. Patient was also having severe headache at the time. CT head and c-spine at the time showed small amount of dorsal epidural and ventral intraspinal air that was felt related to the recent epidural catheter used for her recent delivery.  Patient evaluated by neurolgy previously in week and given blood patch with rapid improvement in headache and neck pain and was discharged on Friday. Patient seen by anesthesia who indicated that an additional blood patch was not warranted and recommended neurology evaluation.     Patient notes that headache remains improved, but her neck pain has returned gradually through the day today. The neck pain remains the same quality and characteristics as previously (above), including exacerbation with rotation and tilting the neck as well as with sitting or standing up, but she denies any weakness or numbness in the face, neck, arms or legs. She is currently still able to do activities of daily living, but her neck pain limits what she feels she can do and she currently rates the pain as a 8/10.      ROS: All negative except as mentioned in HPI    PAST MEDICAL & SURGICAL HISTORY:    MEDICATIONS  (STANDING):  acetaminophen  IVPB .. 1000 milliGRAM(s) IV Intermittent once    MEDICATIONS  (PRN):    Allergies    Allergy Status Unknown    Intolerances        VITAL SIGNS:  Vital Signs Last 24 Hrs  T(C): 36.7 (30 Aug 2020 00:39), Max: 37.3 (29 Aug 2020 17:26)  T(F): 98.1 (30 Aug 2020 00:39), Max: 99.1 (29 Aug 2020 17:26)  HR: 77 (30 Aug 2020 00:39) (68 - 80)  BP: 123/77 (30 Aug 2020 00:39) (123/77 - 146/82)  BP(mean): 103 (29 Aug 2020 21:30) (96 - 108)  RR: 18 (30 Aug 2020 00:39) (18 - 18)  SpO2: 99% (30 Aug 2020 00:39) (96% - 100%)    PHYSICAL EXAMINATION:  General: Well-developed, well nourished, in no acute distress.  Eyes: Conjunctiva and sclera clear.  Neck: Supple, though movement appears limited by pain. No tenderness to palpation  Lung: no respiratory distress noted    Neurologic:  - Mental Status:  Alert, awake, oriented to person, place, and time; Speech is fluent with intact naming, repetition, and comprehension; crosses midline, no extinction or neglect noted;   - Cranial Nerves II-XII:  VFF, No nystagmus noted, EOMI, PERRLA, V1-V3 intact, no facial asymmetry, t/p midline, SCM/trap intact.  - Motor:  Strength is 5/5 throughout.  There is no pronator drift.  Normal muscle bulk and tone throughout. No myoclonus or tremor.  - Reflexes: 2+ biceps, triceps, brachioradialis, patellar, ankle reflexes bilaterally. Plantar responses flexor.  - Sensory:  Intact to light touch, pinprick throughout.  - Coordination:  Finger-nose-finger without dysmetria.  Rapid alternating hand movements intact.  - Gait:  Deferred at this time as patient uncomfortable standing    LABS:                          7.4    10.55 )-----------( 466      ( 30 Aug 2020 00:44 )             23.9           PT/INR - ( 30 Aug 2020 00:44 )   PT: 12.3 sec;   INR: 1.04 ratio         PTT - ( 30 Aug 2020 00:44 )  PTT:29.3 sec    RADIOLOGY & ADDITIONAL STUDIES:      CT Head No Cont (08.27.20 @ 10:19)  Brain CT:    5mm axial sections of the brain were obtained from base to vertex, without the intravenous administration of contrast material. Coronal and sagittal computer generated reconstructed views are available.    No prior brain imaging is available for comparison    The fourth, third and lateral ventricles are normal size and position. There is no hemorrhage, mass or shift of the midline structures. There is normal gray white matter differentiation. Bone window examination is unremarkable.    IMPRESSION: Unremarkable noncontrast CT of the brain. No hemorrhage or abnormal lucencies to suggest hypertensive encephalopathy.      Cervical spine CT:    Serial thin sections on a multi slice scanner were obtained through the cervical spine from the C1 to the T2 level in a stacked axial fashion reformatted at 1.25 mm sections with sagittal and coronal computer generated reconstructed views.    There is normal alignment of the vertebral bodies and facet joints. The cervical vertebrae are normal in height and density. There is mild reversal the normal cervical curvature.    No acute fractures or dislocations are identified. There is epidural air identified in the cervical spine likely related to the epidural catheter for delivery. There also appears to be a small amount of intradural tear at the C1 to level ventrally. This is also likely related to the recent epidural catheter.      IMPRESSION: Reversal the normal cervical curvature. Small amount of dorsal epidural and ventral intraspinal air likely related to recent epidural catheter for delivery.

## 2020-08-30 NOTE — PATIENT PROFILE ADULT - ABILITY TO HEAR (WITH HEARING AID OR HEARING APPLIANCE IF NORMALLY USED):
"SW met with the patient and her daughter, Jenn, while she was starting her chemotherapy, and was in a chemo bed. She has a recurrence of gall bladder cancer. Her first occurrence was 2 years ago. The cancer has spread to her liver and stomach now. She will be receiving palliative chemotherapy with cisplatin and gemzar. She has had problems with vomiting, and also ascites. Her abdomen appears quite distended today.    Pt said she has good support from family, friends and her employer. The patient's mother lives with her. Her daughter lives across the street from her. Her daughter was with her today, and they are very close. She has a 7 year old grandson, who is her \"delight\". 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 Her boss told her not to worry about her job, but to get whatever treatment she needs.     Pt is hopeful that her chemotherapy will offer some relief, and ideally decrease some of the ascites problems she is having. She has had fluid drawn off her abdomen a couple of times. JANE explained our role, and support networks, including [employer] and the [employer] phone support. She said she can talk comfortably with her daughter and some friends, and did not think she was interested in other support systems at this time. SW provided her with a copy of the [facility] Living Will document, reviewed the choices that need to be marked, and offered to notarize it for her when she has completed it. SW offered assistance as needed in the future.      " Adequate: hears normal conversation without difficulty

## 2020-08-30 NOTE — CHART NOTE - NSCHARTNOTEFT_GEN_A_CORE
R3 Update      Spoke with Neurology who recommended MRI to better elucidate C-spine. Recommended IV tylenol and magnesium for pain control. Will be seen by Neuro attending in am. Spoke with radiology to protocol MRI which will not get done till am as well. Discussed with patient and  at bedside.             Plan  - For MRI non con C-spine  - For Mag sulfate and IV tylenol    DERRICK Barton PGY3  d/w Dr. Villegas

## 2020-08-30 NOTE — CONSULT NOTE ADULT - ASSESSMENT
34y/o  now PPD #5 s/p  c/b postpartum spinal headache (s/p blood patch ) with initial improvement to her symptoms, however now presenting with recurrence of her headache. VSS and blood pressures within normal limits.      - Patient discussed with anesthesia, desires repeat blood patch (if she is a candidate)    - Will f/u Anesthesia recommendations     d/w Dr. Bakari Garnica PGY-2
34 yo female with recent spontaneous vaginal delivery and evaluation for post-partum headache with improvement s/p blood patch returning for re-evaluation with recurring neck pain. Patient without headache which is different from last evaluation. Neck pain appears worsened with movement, suggesting possible structural issue not seen on CT localized to cervical spine. Post-partum (and post LP) headache can be referred wholly to the neck, so this may be recurrence of her post-partum headache as well and if so can be symptomatically treated with pain control, but will rule out acute change and/or structural cause of neck pain return.    Impression: neck pain likely 2/2 intradural air causing post-partum headache, but must r/o structural lesion of c-spine causing returning neck pain    Recommendations:  [] MRI c-spine  [] can try pain control with IV tylenol and IV 1g magnesium sulfate  [] if refractory consider IV solumedrol (if breastfeeding will want to dispose of initial breastmilk before feeding baby as solumedrol has been found to be transmitted in breastmilk)  [] expect if post-partum headache to resolve in ~1 week  [] if MRI negative, can consider outpatient neurology followup for neck pain    Case discussed with neurology attending Dr. Hartman who will re-evaluate patient in the morning.

## 2020-08-30 NOTE — CONSULT NOTE ADULT - ATTENDING COMMENTS
Patient seen and examined with neurology resident and above note reviewed in detail and I agree with assessment and plan as outlined. Patient with recent vaginal delivery and had an epidural and subsequently developed a low pressure headache.  She got a blood patch which helped and she was discharged however she now returns with worsening posterior neck pain and head heaviness and pressure and worse with any kind of movement and she has limited mobility of her neck.    She has tried IV tylenol, caffeine and magnesium, hot compresses with little relief    MRI cervical spine reviewed with neuroradiology and shows CSF fluid in the epidural space from C4 down to T1 which starts off ventrally and then courses dorsally. No intrinsic cord signal     Plan: Low pressure headache from recent epidural injection/ blood patch with epidural extension in cervical and upper thoracic spine    1. Start IV solumedrol 125 mg every 12 hours for 4 doses along with IV fluids     2. Increase daily caffeine intake to 60-80 mg daily , continue warm compresses and have patient lie flat     3. Start oxycodone/tylenol 5 mg every  8-12 hours prn for moderate to severe pain    4. Please obtain neurosurgery evaluation to make sure no intervention needed at this time     Discussed with patient and OB team.

## 2020-08-31 DIAGNOSIS — Z37.9 OUTCOME OF DELIVERY, UNSPECIFIED: ICD-10-CM

## 2020-08-31 LAB
SARS-COV-2 IGG SERPL QL IA: NEGATIVE — SIGNIFICANT CHANGE UP
SARS-COV-2 IGM SERPL IA-ACNC: <0.1 INDEX — SIGNIFICANT CHANGE UP

## 2020-08-31 PROCEDURE — 99223 1ST HOSP IP/OBS HIGH 75: CPT

## 2020-08-31 RX ADMIN — HEPARIN SODIUM 5000 UNIT(S): 5000 INJECTION INTRAVENOUS; SUBCUTANEOUS at 18:09

## 2020-08-31 RX ADMIN — Medication 500 MILLIGRAM(S): at 14:03

## 2020-08-31 RX ADMIN — OXYCODONE HYDROCHLORIDE 5 MILLIGRAM(S): 5 TABLET ORAL at 08:43

## 2020-08-31 RX ADMIN — Medication 975 MILLIGRAM(S): at 09:45

## 2020-08-31 RX ADMIN — Medication 1 TABLET(S): at 11:40

## 2020-08-31 RX ADMIN — Medication 325 MILLIGRAM(S): at 08:43

## 2020-08-31 RX ADMIN — Medication 125 MILLIGRAM(S): at 04:50

## 2020-08-31 RX ADMIN — OXYCODONE HYDROCHLORIDE 5 MILLIGRAM(S): 5 TABLET ORAL at 09:45

## 2020-08-31 RX ADMIN — Medication 325 MILLIGRAM(S): at 22:49

## 2020-08-31 RX ADMIN — Medication 500 MILLIGRAM(S): at 22:49

## 2020-08-31 RX ADMIN — HEPARIN SODIUM 5000 UNIT(S): 5000 INJECTION INTRAVENOUS; SUBCUTANEOUS at 06:24

## 2020-08-31 RX ADMIN — Medication 325 MILLIGRAM(S): at 14:03

## 2020-08-31 RX ADMIN — Medication 500 MILLIGRAM(S): at 08:43

## 2020-08-31 RX ADMIN — Medication 975 MILLIGRAM(S): at 08:43

## 2020-08-31 NOTE — PROGRESS NOTE ADULT - ASSESSMENT
A/P:  34yo PPD#7 s/p VAVD c/b post partum spinal headache s/p blood patch on 8/27 readmitted for headache. Found to have a dural tear in the cervical area. Patient's condition stable overnight without worsening of symptoms.

## 2020-08-31 NOTE — PROGRESS NOTE ADULT - ATTENDING COMMENTS
Note reviewed.   Over the course of the morning, mri has been reviewed with Dr Palleschi from anesthesia as well as with the neuroradiologist.  It appears that there was either a misread, transcription error, or overcall on the films.  Apparently the feeling is that a tear is not likely and the etiology of the patient's pain/headache is still due to a spinal headache after her regional anesthesia.  Neuro has been called to see the patient, review the films again, and write a note clearing the patient for a blood patch.  Patient agrees that this is what she has been told.  Anesthesia will not do it without neuro seeing her and writing a note.      will keep on the neuro team to resolve the issue, and anesthesia will proceed with the blood patch if and when the patient is cleared.

## 2020-08-31 NOTE — PROGRESS NOTE ADULT - PROBLEM SELECTOR PLAN 1
-Neurosurgery consult in AM  -MRI performed showing fluid surrounding cervical spinal area  -Monitor VS  -Continue IV solumedrol per neurology  -Appreciate neurology recs  -Tylenol/oxy for pain    Maurice Velásquez PGY-3

## 2020-08-31 NOTE — PROGRESS NOTE ADULT - ATTENDING COMMENTS
NEUROLOGY: JUAN JOSE [phone]    CC: NECK PAIN AFTER DELIVERY    PATIENT SEEN & EXAMINED WITH RESIDENTS  SUNRISE RECORDS REVIEWED  ALLSCRIPTS RECORDS REVIEWED  LABS REVIEWED  IMAGING REVIEWED   SPOKE WITH ANESTHESIOLOGY TEAM (DR LUND)    36 yo RH woman who recently delivered healthy baby and then developed positional headache, manifested as neck pain, not so much cranial discomfort. She was managed with epidural blood patch on 8/27/2020, with improvement about 1-2 hours later and returned home (she has two children and a helpful ). She did well until Friday night/Saturday morning, when the neck pain returned. She lay flat but the pain did not resolve, so she returned to [facility] ED, where she was evaluated by Neurology and MRI of cervical spine was performed.   Since that time, she has remained on bed rest and tells me that her pain has wholly resolved. She declines epidural blood patch.   A head CT showed no subdural hematomas.  Cervical MRI shows some epidural blood, modestly higher than one would expect from the epidural blood patch.   She has no complaints of headache, nausea/vomiting, visual changes or any signs of increased ICP.   No seizures, nor any seizure-like symptomatology.  There are no other cranial nerve complaints: no double-vision, no blurry vision, no facial asymmetry, no trouble swallowing, no hearing loss.  EXAMINATION  CONSTITUTIONAL	Vitals as documented in [employer] chart		  	Healthy appearing, well-groomed, and without deformities.  MENTAL STATUS	Awake and oriented to person, place and date/time  	Attention span & concentration   	Speech - naming, repetition, and comprehension.  	Recent and remote memory.  	Fund of knowledge, including current events; Provides full history.  CRANIAL NERVES	II: Visual fields are full.   	III, IV, and VI: PERRLA. EOMs are normal.  	V: Facial sensation is normal bilaterally  	VII: Facial strength is normal bilaterally.  	VIII: Hearing is intact.  	IX, X: Palate is midline and gag intact.  	XI: SCM and trapezius have normal strength bilaterally.  	XII: Tongue is midline and there is no atrophy or fasciculations.  MOTOR	Muscle tone: no evidence of rigidity or resistance.  Muscle strength: arms and legs, proximal and distal, flexors and extensors are normal. No atrophy or fasciculations.  SENSATION	Normal in arms, legs, and trunk.  COORDINATION	Rapid alternating movements are normal in the upper and lower extremities. Finger/nose & heel/knee/shin are normal bilaterally.  REFLEXES	All present and normal at biceps, triceps, and brachioradialis bilaterally. Knees and ankle jerks are normal bilaterally as well. Toes are downgoing.  GAIT	Normal, including heels, toes and tandem.  CARDIOVASCULAR	No peripheral edema.  HEENT	Atraumatic.   IMAGING  i personally and independently reviewed available MR and CT imaging, for the following interpretation: The NEUROimaging reviewed is dated  8/27/20 (CT HEAD)	8/28/20 (MR C spine)		    In reviewing these images, I find epidural fluid collect that is hyperintense on T1, c/w blood. It extends to the C2 level. There is no blood seen on the CT. There is some air, which is likely post-procedural.  LABS  8/30/2020  10.55\7.4/466      /23.9\  PT/PTT/INR=12.3/29.3/1.04  142|107|8/95  4.2|20|0.63 \  IMPRESSION/PLAN  NECK PAIN – Now resolved. This could be headache equivalent for a CSF hypovolemia etiology. Alternatively, it may be related to the cervical disc bulge at C34  EPIDURAL BLOOD PRODUCTS – The epidural blood patch was performed on 8/27/20 and the imaging was performed on 8/28/2020. Moreover, she was laying down, with her head low between these two procedures, allowing blood to travel caudally.   REPEAT EPIDURAL  – I discussed with the patient, her , and anesthesiology that at this time – given resolution of symptoms and curious response to first patch – I do not think a repeat epidural blood patch is indicated. I am not sure if her CSF hypovolemia was related to the challenging epidural or perhaps a spontaneous CSF leak in the setting of pregnancy (described in detail in the literature). I reviewed what new symptoms may develop and the available treatment plans. After answering their questions, we discussed expected outcomes and prognosis.   DISPO – In order to ensure continued resolution of her symptoms, I provided her with my care and I instructed the patient to call [phone] for further, continued outpatient care with me.    TOTAL TIME SPENT WAS 46 MINUTES, OF WHICH HALF IN COUNSELLING AND COORDINATION OF CARE.

## 2020-08-31 NOTE — PROGRESS NOTE ADULT - ASSESSMENT
36 yo female with recent spontaneous vaginal delivery and evaluation for post-partum headache with improvement s/p blood patch returning for re-evaluation with recurring neck pain. Patient without headache which is different from last evaluation. Neck pain appears worsened with movement, suggesting possible structural issue not seen on CT localized to cervical spine. Post-partum (and post LP) headache can be referred wholly to the neck, so this may be recurrence of her post-partum headache as well and if so can be symptomatically treated with pain control.    Impression: neck pain likely 2/2 blood in dural space from blood patch contributing to post-partum and post-dural puncture (epidural) headache    Recommendations:  [x] MRI c-spine - reviewed with neuroradiology, blood likely from blood patch in dural region of cervical spine  [x] can try pain control with IV and PO tylenol, IVF, may trial caffeine if tolerated by patient  [ ] please stop steroids and oxycodone at this time  [ ] no need for blood patch at this time    mgmt d/w Neurology Attending Dr. Escalona, Anesthesiologist Dr. Jj, Ob-Gyn, patient and  at bedside 34 yo female with recent spontaneous vaginal delivery and evaluation for post-partum headache with improvement s/p blood patch returning for re-evaluation with recurring neck pain. Patient without headache which is different from last evaluation. Neck pain appears worsened with movement, suggesting possible structural issue not seen on CT localized to cervical spine. Post-partum (and post LP) headache can be referred wholly to the neck, so this may be recurrence of her post-partum headache as well and if so can be symptomatically treated with pain control.    Impression: neck pain likely 2/2 blood in dural space from blood patch contributing to post-partum and post-dural puncture (epidural) headache    Recommendations:  [x] MRI c-spine - reviewed with neuroradiology, blood likely from blood patch in dural region of cervical spine  [x] can try pain control with IV and PO tylenol, IVF, may trial caffeine if tolerated by patient  [ ] please stop steroids and oxycodone at this time  [ ] no need for blood patch at this time  [ ] upon discharge patient can follow up with Dr. Escalona Neurology as outpatient  [phone]    mgmt d/w Neurology Attending Dr. Escalona, Anesthesiologist Dr. Jj, Ob-Gyn, patient and  at bedside 34 yo female with recent spontaneous vaginal delivery and evaluation for post-partum headache with improvement s/p blood patch returning for re-evaluation with recurring neck pain. Patient without headache which is different from last evaluation. Neck pain appears worsened with movement, suggesting possible structural issue not seen on CT localized to cervical spine. Post-partum (and post LP) headache can be referred wholly to the neck, so this may be recurrence of her post-partum headache as well and if so can be symptomatically treated with pain control.    Impression: neck pain likely 2/2 blood in dural space from blood patch contributing to post-partum and post-dural puncture (epidural) headache    Recommendations:  [x] MRI c-spine - reviewed with neuroradiology, blood likely from blood patch in dural region of cervical spine  [x] can try pain control with IV and PO tylenol, IVF, may trial caffeine if tolerated by patient  [ ] patient should stay another night in hospital - to be re-evaluated by Neurology 9/1 AM for clinical improvement  [ ] advised patient to stay flat for the rest of the night  [ ] please stop steroids and oxycodone at this time  [ ] no need for blood patch at this time  [ ] upon discharge patient can follow up with Dr. Escalona Neurology as outpatient  [phone]    mgmt d/w Neurology Attending Dr. Escalona, Anesthesiologist Dr. Jj, Ob-Gyn, patient and  at bedside 36 yo female with recent spontaneous vaginal delivery and evaluation for post-partum headache with improvement s/p blood patch returning for re-evaluation with recurring neck pain. Patient without headache which is different from last evaluation. Neck pain appears worsened with movement, suggesting possible structural issue not seen on CT localized to cervical spine. Post-partum (and post LP) headache can be referred wholly to the neck, so this may be recurrence of her post-partum headache as well and if so can be symptomatically treated with pain control.    Impression: CSF HYPOVOLEMIA with MRI demonstrating blood in dural space from blood patch contributing to post-partum and post-epidural anesthesia headache    Recommendations:  [x] MRI c-spine - reviewed with neuroradiology, blood likely from blood patch in dural region of cervical spine  [x] can try pain control with IV and PO tylenol, IVF, may trial caffeine if tolerated by patient  [ ] patient should stay another night in hospital - to be re-evaluated by Neurology 9/1 AM for clinical improvement  [ ] advised patient to stay flat for the rest of the night  [ ] please stop steroids and oxycodone at this time  [ ] no need for blood patch at this time  [ ] upon discharge patient can follow up with Dr. Escalona Neurology as outpatient  [phone]    mgmt d/w Neurology Attending Dr. Escalona, Anesthesiologist Dr. Jj, Ob-Gyn, patient and  at bedside

## 2020-09-01 ENCOUNTER — TRANSCRIPTION ENCOUNTER (OUTPATIENT)
Age: 35
End: 2020-09-01

## 2020-09-01 VITALS
OXYGEN SATURATION: 98 % | RESPIRATION RATE: 18 BRPM | SYSTOLIC BLOOD PRESSURE: 117 MMHG | HEART RATE: 76 BPM | TEMPERATURE: 98 F | DIASTOLIC BLOOD PRESSURE: 82 MMHG

## 2020-09-01 PROCEDURE — 85610 PROTHROMBIN TIME: CPT

## 2020-09-01 PROCEDURE — 86900 BLOOD TYPING SEROLOGIC ABO: CPT

## 2020-09-01 PROCEDURE — 85027 COMPLETE CBC AUTOMATED: CPT

## 2020-09-01 PROCEDURE — 86850 RBC ANTIBODY SCREEN: CPT

## 2020-09-01 PROCEDURE — 99238 HOSP IP/OBS DSCHRG MGMT 30/<: CPT | Mod: GC

## 2020-09-01 PROCEDURE — 80053 COMPREHEN METABOLIC PANEL: CPT

## 2020-09-01 PROCEDURE — 72141 MRI NECK SPINE W/O DYE: CPT

## 2020-09-01 PROCEDURE — 85730 THROMBOPLASTIN TIME PARTIAL: CPT

## 2020-09-01 PROCEDURE — 84550 ASSAY OF BLOOD/URIC ACID: CPT

## 2020-09-01 PROCEDURE — U0003: CPT

## 2020-09-01 PROCEDURE — 85384 FIBRINOGEN ACTIVITY: CPT

## 2020-09-01 PROCEDURE — 86769 SARS-COV-2 COVID-19 ANTIBODY: CPT

## 2020-09-01 PROCEDURE — 86901 BLOOD TYPING SEROLOGIC RH(D): CPT

## 2020-09-01 PROCEDURE — 83615 LACTATE (LD) (LDH) ENZYME: CPT

## 2020-09-01 RX ADMIN — Medication 500 MILLIGRAM(S): at 08:50

## 2020-09-01 RX ADMIN — Medication 325 MILLIGRAM(S): at 08:50

## 2020-09-01 RX ADMIN — HEPARIN SODIUM 5000 UNIT(S): 5000 INJECTION INTRAVENOUS; SUBCUTANEOUS at 06:04

## 2020-09-01 NOTE — PROGRESS NOTE ADULT - ASSESSMENT
A/P: 34yo PPD#8 s/p VAVD c/b post partum spinal headache s/p blood patch on 8/27 readmitted for headache. Headache has resolved without need for intervention. She is currently asymptomatic and is not a candidate for a blood patch at this time. Patient was found to not have a dural cervical tear

## 2020-09-01 NOTE — DISCHARGE NOTE OB - CARE PLAN
Principal Discharge DX:	Spinal headache  Goal:	pain control  Assessment and plan of treatment:	pain control  Secondary Diagnosis:	Vacuum-assisted vaginal delivery

## 2020-09-01 NOTE — PROGRESS NOTE ADULT - SUBJECTIVE AND OBJECTIVE BOX
OB Progress Note: VAVD PPD#7    S: 34yo PPD#7 s/p VAVD c/b post partum spinal headache s/p blood patch on 8/27 readmitted for headache. Found to have a dural tear in the cervical area. Patient feels better than earlier this morning. Tolerating steroids well. Headache has improved.  Pain is well controlled. She is tolerating a regular diet and passing flatus. She is voiding spontaneously, and ambulating without difficulty. Denies CP/SOB. Denies lightheadedness/dizziness/changes in vision. Denies N/V. Denies heavy vaginal bleeding.    O:  Vitals:   Vital Signs Last 24 Hrs  T(C): 36.7 (31 Aug 2020 00:59), Max: 36.8 (30 Aug 2020 08:20)  T(F): 98.1 (31 Aug 2020 00:59), Max: 98.2 (30 Aug 2020 08:20)  HR: 84 (31 Aug 2020 00:59) (69 - 84)  BP: 129/81 (31 Aug 2020 00:59) (122/72 - 141/67)  BP(mean): --  RR: 18 (31 Aug 2020 00:59) (16 - 20)  SpO2: 99% (31 Aug 2020 00:59) (96% - 99%)    MEDICATIONS  (STANDING):  ascorbic acid 500 milliGRAM(s) Oral three times a day  ferrous    sulfate 325 milliGRAM(s) Oral three times a day  heparin   Injectable 5000 Unit(s) SubCutaneous every 12 hours  lactated ringers. 1000 milliLiter(s) (75 mL/Hr) IV Continuous <Continuous>  methylPREDNISolone sodium succinate Injectable 125 milliGRAM(s) IV Push every 12 hours  prenatal multivitamin 1 Tablet(s) Oral daily  senna 2 Tablet(s) Oral at bedtime    MEDICATIONS  (PRN):  acetaminophen   Tablet .. 975 milliGRAM(s) Oral every 6 hours PRN Moderate Pain (4 - 6), Severe Pain (7 - 10)  benzocaine 20%/menthol 0.5% Spray 1 Spray(s) Topical every 6 hours PRN for Perineal discomfort  dibucaine 1% Ointment 1 Application(s) Topical every 6 hours PRN Perineal discomfort  diphenhydrAMINE 25 milliGRAM(s) Oral every 6 hours PRN Pruritus  hydrocortisone 1% Cream 1 Application(s) Topical every 6 hours PRN Moderate Pain (4-6)  lanolin Ointment 1 Application(s) Topical every 6 hours PRN nipple soreness  magnesium hydroxide Suspension 30 milliLiter(s) Oral two times a day PRN Constipation  oxyCODONE    IR 5 milliGRAM(s) Oral every 4 hours PRN Severe Pain (7 - 10)  pramoxine 1%/zinc 5% Cream 1 Application(s) Topical every 4 hours PRN Moderate Pain (4-6)  simethicone 80 milliGRAM(s) Chew every 4 hours PRN Gas  witch hazel Pads 1 Application(s) Topical every 4 hours PRN Perineal discomfort      Labs:  Blood type: AB Positive  Rubella IgG: RPR: Negative                          7.4<L>   10.55<H> >-----------< 466<H>    ( 08-30 @ 00:44 )             23.9<L>    08-30-20 @ 00:44      142  |  107  |  8   ----------------------------<  95  4.2   |  20<L>  |  0.63        Ca    9.2      30 Aug 2020 00:44    TPro  6.2  /  Alb  3.4  /  TBili  0.1<L>  /  DBili  x   /  AST  24  /  ALT  30  /  AlkPhos  117  08-30-20 @ 00:44      Physical Exam:  General: NAD  Abdomen: soft, non-tender, non-distended, fundus firm  Vaginal: No heavy vaginal bleeding  Extremities: No erythema/edema
OB Attending Note    S: Patient doing well. Pain in neck minimal to none and very much improved since arrival. No headache at this time. Ambulating fine. Minimal lochia. No obstetrical complaints    O: Vital Signs Last 24 Hrs  T(C): 36.5 (01 Sep 2020 09:00), Max: 37 (31 Aug 2020 19:55)  T(F): 97.7 (01 Sep 2020 09:00), Max: 98.6 (31 Aug 2020 19:55)  HR: 76 (01 Sep 2020 09:) (66 - 86)  BP: 117/82 (01 Sep 2020 09:00) (104/64 - 136/84)  BP(mean): --  RR: 18 (01 Sep 2020 09:00) (18 - 18)  SpO2: 98% (01 Sep 2020 09:) (98% - 98%)    Gen: NAD  Abd: soft, NT, ND, fundus firm below umbilicus  Lochia: min  Perineum healing well    Labs:      A: 35y PPD#8 s/p  here for readmission for spinal headache    Plan:   Cleared by neuro for discharge. Conservative treatment at home. Will follow up with neuro in 1 month. Info for neuro given and will place in discharge instructions. To follow up post partum in 4 weeks or earlier as needed.     Adore Villegas DO
OB Attending Note    S: Patient feels no improvement overnight with tylenol and magnesium. Still with pain in neck. She has severe pain upon movement of neck and walking. No tingling in hands, arms, legs or feet. No difficulty walking. No blurry vision, RUQ pain. Vaginal bleeding minimal. States she feels like "i broke my neck in a car accident." No dizziness or lightheadedness.     O: Vital Signs Last 24 Hrs  T(C): 36.8 (30 Aug 2020 08:20), Max: 37.3 (29 Aug 2020 17:26)  T(F): 98.2 (30 Aug 2020 08:20), Max: 99.1 (29 Aug 2020 17:26)  HR: 74 (30 Aug 2020 08:20) (68 - 80)  BP: 122/72 (30 Aug 2020 08:20) (121/78 - 146/82)  BP(mean): 103 (29 Aug 2020 21:30) (96 - 108)  RR: 18 (30 Aug 2020 08:20) (18 - 18)  SpO2: 96% (30 Aug 2020 08:20) (96% - 100%)    Gen: NAD  Abd: soft, NT, ND, fundus firm below umbilicus  Lochia: min  Perineum healing well  Ext: no tenderness    Labs:                        7.4    10.55 )-----------( 466      ( 30 Aug 2020 00:44 )             23.9       A: 35y PPD#6 s/p  with PPH with severe neck pain s/p blood patch    Plan: cont PP care  OOB/ambulation  Pain control  awaiting MRI this morning. Reviewed and appreciate neuro note. Once MRI done will review plan and outpatient care with neuro. Patient aware of plan and all questions answered.    Adore Villegas DO
OB Progress Note: VAVD PPD#8    S: 34yo  PPD#8 s/p s/p VAVD c/b post partum spinal headache s/p blood patch on 8/27 readmitted for headache. Patient's headache resolved without need for intervention.Patient feels well. Pain is well controlled. She is tolerating a regular diet and passing flatus. She is voiding spontaneously, and ambulating without difficulty. Denies CP/SOB. Denies lightheadedness/dizziness. Denies N/V. Denies heavy vaginal bleeding.    O:  Vitals:  Vital Signs Last 24 Hrs  T(C): 36.8 (01 Sep 2020 05:43), Max: 37 (31 Aug 2020 19:55)  T(F): 98.2 (01 Sep 2020 05:43), Max: 98.6 (31 Aug 2020 19:55)  HR: 71 (01 Sep 2020 05:43) (66 - 86)  BP: 136/84 (01 Sep 2020 05:43) (104/64 - 136/84)  BP(mean): --  RR: 18 (01 Sep 2020 05:43) (18 - 18)  SpO2: 98% (01 Sep 2020 05:43) (98% - 99%)    MEDICATIONS  (STANDING):  ascorbic acid 500 milliGRAM(s) Oral three times a day  ferrous    sulfate 325 milliGRAM(s) Oral three times a day  heparin   Injectable 5000 Unit(s) SubCutaneous every 12 hours  lactated ringers. 1000 milliLiter(s) (75 mL/Hr) IV Continuous <Continuous>  prenatal multivitamin 1 Tablet(s) Oral daily  senna 2 Tablet(s) Oral at bedtime      Labs:  Blood type: AB Positive  Rubella IgG: RPR: Negative                          7.4<L>   10.55<H> >-----------< 466<H>    ( 08-30 @ 00:44 )             23.9<L>    08-30-20 @ 00:44      142  |  107  |  8   ----------------------------<  95  4.2   |  20<L>  |  0.63        Ca    9.2      30 Aug 2020 00:44    TPro  6.2  /  Alb  3.4  /  TBili  0.1<L>  /  DBili  x   /  AST  24  /  ALT  30  /  AlkPhos  117  08-30-20 @ 00:44          Physical Exam:  General: NAD  Abdomen: soft, non-tender, non-distended, fundus firm  Vaginal: No heavy vaginal bleeding  Extremities: No erythema/edema
Pt. seen yesterday evening 16:00 for evaluation and discussion re: repeat epidural blood patch.  Pt. reported that her symptoms began resolving sometime earlier that morning, and since has been out of bed several times to bathroom without any headache or neck pain.  She was sitting up at approx. 45-60 degrees without pain, and smiling during conversation.  I had reiterated the risks/benefits of repeat epidural blood patch vs. conservative therapy, with all patient questions answered to her satisfaction.  I advised the patient that her clinical improvement and absence of symptoms may represent slowing/cessation of CSF leak (assuming this was the cause of CSF hypovolemia), and that epidural blood patch was not indicated at that time.  I advised the patient and  that it would be more prudent to monitor her clinical course for any recurrence of symptoms, at which time repeat EBP may be considered.  I subsequently spoke to Dr. Lemus who agreed that repeat EBP was not indicated at this time.    The patient was seen by me again this am without any clinical signs/symptoms of post dural puncture headache either supine or sitting/standing/walking.  Agree with plan for discharge home today, with followup as required by any recurrence of symptoms.
SUBJECTIVE: patient seen and examined at bedside by Neurology Resident and Attending. patient reporting improvement in neck pain. MRI cervical spine reviewed with Neuroradiology.     MEDICATIONS (HOME):  Home Medications:  acetaminophen 325 mg oral tablet: 3 tab(s) orally  (24 Aug 2020 22:13)  cyclobenzaprine 10 mg oral tablet: 1 tab(s) orally 3 times a day (28 Aug 2020 10:19)  [place] 325 mg oral tablet: 1 tab(s) orally 3 times a day (28 Aug 2020 10:19)  ibuprofen 600 mg oral tablet: 1 tab(s) orally every 6 hours (28 Aug 2020 10:19)  ibuprofen 600 mg oral tablet: 1 tab(s) orally every 6 hours (24 Aug 2020 22:13)    MEDICATIONS  (STANDING):  ascorbic acid 500 milliGRAM(s) Oral three times a day  ferrous    sulfate 325 milliGRAM(s) Oral three times a day  heparin   Injectable 5000 Unit(s) SubCutaneous every 12 hours  lactated ringers. 1000 milliLiter(s) (75 mL/Hr) IV Continuous <Continuous>  methylPREDNISolone sodium succinate Injectable 125 milliGRAM(s) IV Push every 12 hours  prenatal multivitamin 1 Tablet(s) Oral daily  senna 2 Tablet(s) Oral at bedtime    MEDICATIONS  (PRN):  acetaminophen   Tablet .. 975 milliGRAM(s) Oral every 6 hours PRN Moderate Pain (4 - 6), Severe Pain (7 - 10)  benzocaine 20%/menthol 0.5% Spray 1 Spray(s) Topical every 6 hours PRN for Perineal discomfort  dibucaine 1% Ointment 1 Application(s) Topical every 6 hours PRN Perineal discomfort  diphenhydrAMINE 25 milliGRAM(s) Oral every 6 hours PRN Pruritus  hydrocortisone 1% Cream 1 Application(s) Topical every 6 hours PRN Moderate Pain (4-6)  lanolin Ointment 1 Application(s) Topical every 6 hours PRN nipple soreness  magnesium hydroxide Suspension 30 milliLiter(s) Oral two times a day PRN Constipation  oxyCODONE    IR 5 milliGRAM(s) Oral every 4 hours PRN Severe Pain (7 - 10)  pramoxine 1%/zinc 5% Cream 1 Application(s) Topical every 4 hours PRN Moderate Pain (4-6)  simethicone 80 milliGRAM(s) Chew every 4 hours PRN Gas  witch hazel Pads 1 Application(s) Topical every 4 hours PRN Perineal discomfort    ALLERGIES/INTOLERANCES:  Allergies  No Known Allergies    VITALS & EXAMINATION:  Vital Signs Last 24 Hrs  T(C): 36.9 (31 Aug 2020 13:13), Max: 36.9 (31 Aug 2020 13:13)  T(F): 98.4 (31 Aug 2020 13:13), Max: 98.4 (31 Aug 2020 13:13)  HR: 66 (31 Aug 2020 13:13) (66 - 84)  BP: 104/64 (31 Aug 2020 13:13) (104/64 - 141/67)  BP(mean): --  RR: 18 (31 Aug 2020 13:13) (16 - 18)  SpO2: 98% (31 Aug 2020 13:13) (96% - 99%)    Neurological (>12):  MS: Awake, alert, oriented to person, place, situation, time. Normal affect. Follows all commands.    Language: Speech is clear, fluent with good repetition & comprehension (able to name objects___)    CNs: VFF. EOMI no nystagmus. V1-3 intact to LT/pinprick, well developed masseter muscles b/l. No facial asymmetry b/l, full eye closure strength b/l. Hearing grossly normal (rubbing fingers) b/l. Symmetric palate elevation in midline. Gag reflex deferred. Head turning & shoulder shrug intact b/l. Tongue midline, normal movements, no atrophy.    Motor: Normal muscle bulk & tone. No noticeable tremor. No pronator drift. full ROM of neck flexion/extension/lateral bending	     Sensation: Intact to LT b/l throughout.     Cortical: Extinction on DSS (neglect): none    Reflexes:              Biceps(C5)       BR(C6)     Triceps(C7)               Patellar(L4)    Achilles(S1)    Plantar Resp  R	2	          2	             2		        2		    2		Down   L	2	          2	             2		        2		    2		Down     Coordination: intact rapid-alt movements. No dysmetria to FTN    Gait: deferred    LABORATORY:  CBC                       7.4    10.55 )-----------( 466      ( 30 Aug 2020 00:44 )             23.9     Chem 08-30    142  |  107  |  8   ----------------------------<  95  4.2   |  20<L>  |  0.63    Ca    9.2      30 Aug 2020 00:44    TPro  6.2  /  Alb  3.4  /  TBili  0.1<L>  /  DBili  x   /  AST  24  /  ALT  30  /  AlkPhos  117  08-30    LFTs LIVER FUNCTIONS - ( 30 Aug 2020 00:44 )  Alb: 3.4 g/dL / Pro: 6.2 g/dL / ALK PHOS: 117 U/L / ALT: 30 U/L / AST: 24 U/L / GGT: x           Coagulopathy PT/INR - ( 30 Aug 2020 00:44 )   PT: 12.3 sec;   INR: 1.04 ratio         PTT - ( 30 Aug 2020 00:44 )  PTT:29.3 sec    STUDIES & IMAGING:  Studies (EKG, EEG, EMG, etc):     Radiology (XR, CT, MR, U/S, TTE/SPENCER):    < from: MR Cervical Spine No Cont (08.30.20 @ 11:47) >  IMPRESSION:    Extensive epidural fluid surrounding the cervical and visualized upper thoracic cord. The epidural fluid is predominantly ventral from the upper cervical canal through T1/T2 and predominantly dorsal at T1-T3. The cervical cord appears mildly enlarged within the mid cervical region measuring up to 11 mm AP x 14 mm TR. There is no abnormal signal within the cervical cord or the visualized portion of the upper thoracic cord from T1 through T3.    < end of copied text >

## 2020-09-01 NOTE — DISCHARGE NOTE OB - ADDITIONAL INSTRUCTIONS
To call if you have severe headache or neck pain unresolved with medication, heavy vaginal bleeding. To follow up with neurology  Dr. Escalona Neurology

## 2020-09-01 NOTE — DISCHARGE NOTE OB - CARE PROVIDER_API CALL
Adore Villegas (DO)  [facility] [address], [address]  [place] [id number]  Phone: [phone]  Fax: [phone]  Follow Up Time:

## 2020-09-01 NOTE — DISCHARGE NOTE OB - PATIENT PORTAL LINK FT
You can access the FollowMyHealth Patient Portal offered by [facility] by registering at the following website: [email or web address]. By joining [facility]’s FollowMyHealth portal, you will also be able to view your health information using other applications (apps) compatible with our system.

## 2020-09-01 NOTE — DISCHARGE NOTE OB - INSTRUCTIONS
Keep follow up appointment with doctor as instructed and call doctor with recurrence of head or neck pain, and with any questions or concerns.

## 2020-09-01 NOTE — DISCHARGE NOTE OB - HOSPITAL COURSE
Patient admitted for severe neck pain. Seen by neuro and MRI showed cervical pain due to ? tear in dural vs just spinal headache post epidural. Originally started on solumedrol which was stopped after a reread of MRI indicating more likely spinal headache. Blood patch was not redone as her pain resolved.

## 2020-10-23 NOTE — CONSULT NOTE ADULT - CONSULT REQUESTED DATE/TIME
26-Aug-2020 15:41 Simple: Patient demonstrates quick and easy understanding/Straightforward: Basic instructions, no meds, no home treatment
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
## 2021-11-08 NOTE — PROGRESS NOTE ADULT - PROBLEM SELECTOR PLAN 1
RASHAUN FLORES Patient is a 50y old  Female who presents with a chief complaint of Transfer for EEG (08 Nov 2021 01:47)     HPI:  50F w/ PMHx TBI, functional paraplegia, W/C bound at baseline, C spine fixation, s/p trach/ PEG (now decannulated), seizure d/o, urinary retention w/ indwelling arthur, DVT on Coumadin, ESBL E Coli bacteriemia was sent over from NH to [facility] w/ AMS and now transferred to [facility] for cvEEG. On presentation to [facility] she was altered, hypotensive, febrile 101 and in DIEUDONNE w/ a supra therapeutic INR ~ 9.5. Code stroke was called and stat CTH was grossly normal. Later a code sepsis was called and pt was resuscitated and given IV Abx. Her neuro status further worsened, and she became unresponsive to noxious stimuli. Not intubated as he was a DNR/DNI. Pt was loaded w/ Keppra and another CTH was requested which was also WNLs. A 3rd CTH was obtained 6hrs later which was also normal.   Over the next 24hrs she was on and off pressors and weaned of VM to RA. INR came down to 2.7 after Vit K. EEG was suggestive of a catastrophic neurological insult w/ B/L GPEDs. BCx were positive for ESBL E Coli and Meropenem was switched to Ayvcaz as per ID recs. Pt was loaded w/ VA 2g prior to transfer to [facility]  (30 Oct 2021 05:02)    The patient was seen and evaluated unclear if shes seeing things- ask me to move all these people out- room is empty   The patient is in no acute distress.      I&O's Summary    07 Nov 2021 07:01  -  08 Nov 2021 07:00  --------------------------------------------------------  IN: 480 mL / OUT: 1135 mL / NET: -655 mL    08 Nov 2021 07:01  -  08 Nov 2021 10:52  --------------------------------------------------------  IN: 120 mL / OUT: 200 mL / NET: -80 mL      Allergies    No Known Allergies    Intolerances      HEALTH ISSUES - PROBLEM Dx:        PAST MEDICAL & SURGICAL HISTORY:  TBI (traumatic brain injury)    History of paraplegia    DVT, lower extremity    Chronic neck pain with history of cervical spinal surgery    History of Lorenza-en-Y gastric bypass            Vital Signs Last 24 Hrs  T(C): 36.8 (08 Nov 2021 08:00), Max: 36.9 (07 Nov 2021 20:00)  T(F): 98.2 (08 Nov 2021 08:00), Max: 98.5 (08 Nov 2021 02:00)  HR: 111 (08 Nov 2021 08:00) (69 - 111)  BP: 132/44 (08 Nov 2021 08:00) (101/63 - 149/74)  BP(mean): 64 (08 Nov 2021 08:00) (64 - 98)  RR: 17 (08 Nov 2021 08:00) (16 - 18)  SpO2: 100% (08 Nov 2021 08:00) (99% - 100%)T(C): 36.8 (11-08-21 @ 08:00), Max: 36.9 (11-07-21 @ 20:00)  HR: 111 (11-08-21 @ 08:00) (69 - 111)  BP: 132/44 (11-08-21 @ 08:00) (101/63 - 149/74)  RR: 17 (11-08-21 @ 08:00) (16 - 18)  SpO2: 100% (11-08-21 @ 08:00) (99% - 100%)  Wt(kg): --    PHYSICAL EXAM:    GENERAL: NAD,   HEAD:  Atraumatic, Normocephalic  NERVOUS SYSTEM:  Alert & Moves upper and paraplegic  lower extremities; CNS-II-XII  CHEST/LUNG: Clear to auscultation bilaterally; No rales, rhonchi, wheezing,   HEART: Regular rate and rhythm; No murmurs,   ABDOMEN: Soft, Nontender, Nondistended; Bowel sounds present  EXTREMITIES:  Peripheral Pulses, No  cyanosis, or edema  psychiatry- cant asses Insight and judgement intact     chlorhexidine 4% Liquid 1 Application(s) Topical <User Schedule>  ferrous    sulfate 325 milliGRAM(s) Oral two times a day  folic acid 1 milliGRAM(s) Oral daily  levETIRAcetam 750 milliGRAM(s) Oral two times a day  meropenem  IVPB 1000 milliGRAM(s) IV Intermittent every 8 hours  multivitamin 1 Tablet(s) Oral daily  mupirocin 2% Nasal 1 Application(s) Nasal every 12 hours  oxyCODONE    IR 5 milliGRAM(s) Oral every 6 hours PRN  oxyCODONE    IR 10 milliGRAM(s) Oral every 6 hours PRN  pantoprazole   Suspension 40 milliGRAM(s) Oral before breakfast  PARoxetine 30 milliGRAM(s) Oral daily  petrolatum Ophthalmic Ointment 1 Application(s) Both EYES every 6 hours  polyethylene glycol 3350 17 Gram(s) Oral at bedtime  senna 2 Tablet(s) Oral at bedtime      LABS:                          8.5    18.63 )-----------( 498      ( 08 Nov 2021 06:40 )             26.8             PT/INR - ( 07 Nov 2021 08:20 )   PT: 43.3 sec;   INR: 3.99 ratio         PTT - ( 07 Nov 2021 08:20 )  PTT:44.3 sec        CAPILLARY BLOOD GLUCOSE          RADIOLOGY & ADDITIONAL TESTS:      Consultant notes reviewed    Case discussed with consultant/provider/ family /patient  -Salumedrol stopped  -Monitor VS  -Continue Tylenol for pain  -Likely discharge today    Maurice Velásquez PGY-3

## 2022-03-27 PROBLEM — Z37.9 OUTCOME OF DELIVERY, UNSPECIFIED: Chronic | Status: ACTIVE | Noted: 2020-08-30

## 2022-03-27 PROBLEM — O03.9 COMPLETE OR UNSPECIFIED SPONTANEOUS ABORTION WITHOUT COMPLICATION: Chronic | Status: ACTIVE | Noted: 2020-08-30

## 2022-03-27 PROBLEM — O00.90 UNSPECIFIED ECTOPIC PREGNANCY WITHOUT INTRAUTERINE PREGNANCY: Chronic | Status: ACTIVE | Noted: 2020-08-30

## 2022-04-05 ENCOUNTER — APPOINTMENT (OUTPATIENT)
Dept: ORTHOPEDIC SURGERY | Facility: CLINIC | Age: 37
End: 2022-04-05
Payer: COMMERCIAL

## 2022-04-05 VITALS — BODY MASS INDEX: 33.8 KG/M2 | WEIGHT: 223 LBS | HEIGHT: 68 IN

## 2022-04-05 DIAGNOSIS — Z78.9 OTHER SPECIFIED HEALTH STATUS: ICD-10-CM

## 2022-04-05 DIAGNOSIS — S83.411A SPRAIN OF MEDIAL COLLATERAL LIGAMENT OF RIGHT KNEE, INITIAL ENCOUNTER: ICD-10-CM

## 2022-04-05 PROCEDURE — 99213 OFFICE O/P EST LOW 20 MIN: CPT

## 2022-04-05 NOTE — PHYSICAL EXAM
[Right] : right knee [5___] : hamstring 5[unfilled]/5 [] : ligamentously stable [TWNoteComboBox7] : flexion 135 degrees [de-identified] : extension 0 degrees

## 2022-04-05 NOTE — HISTORY OF PRESENT ILLNESS
[Gradual] : gradual [6] : 6 [2] : 2 [Dull/Aching] : dull/aching [Sharp] : sharp [FreeTextEntry1] : rt knee  [FreeTextEntry5] : follow up on rt knee pain

## 2022-05-17 ENCOUNTER — APPOINTMENT (OUTPATIENT)
Dept: ORTHOPEDIC SURGERY | Facility: CLINIC | Age: 37
End: 2022-05-17

## 2022-11-30 NOTE — PATIENT PROFILE ADULT - IS THERE A SUSPICION OF ABUSE/NEGLIGENCE?
[FreeTextEntry1] : Patient is a 62 year old female with a past medical history as above who presents for Blood pressure check
no

## 2023-02-12 NOTE — CONSULT NOTE ADULT - ATTENDING COMMENTS
I performed a history and physical examination of the patient and discussed the management of the patient with the resident. I reviewed the resident's note and agree with the documented findings and plan of care with the following additions/exceptions.    DOS 8/27/20    she reports that yesterday when she laid flat she felt no neck pain, but today even flat was 3/10. when she sits up the neck pain develops, and today on exam it was 10/10 and she was tearful when she sat up.  denies n/v/diplopia  on exam flat she had eomi, face symmetric, no dysarthria, ffm intact, no drift, limbs symmetric    positional neck pain seems related to intracranial hypotension s/p epidural anesthesia, could also be irritation for the air seen in the epidural space on the ct c spine. no brain sag seen on the hct.    agree with anesthesias plan for epidural blood patch I performed a history and physical examination of the patient and discussed the management of the patient with the resident. I reviewed the resident's note and agree with the documented findings and plan of care with the following additions/exceptions.    DOS 8/27/20    she reports that yesterday when she laid flat she felt no neck pain, but today even flat was 3/10. when she sits up the neck pain develops, and today on exam it was 10/10 and she was tearful when she sat up.  denies n/v/diplopia  on exam flat she had eomi, face symmetric, no dysarthria, ffm intact, no drift, limbs symmetric    positional neck pain seems related to intracranial hypotension s/p epidural anesthesia, could also be irritation from the air seen in the epidural space on the ct c spine (there is no C1 epidural tear, that was a typo, Dr. Montana is going to fix the report, I reviewed the imaging with her). no brain sag seen on the hct.    agree with anesthesias plan for epidural blood patch Statement Selected

## 2023-02-28 ENCOUNTER — NON-APPOINTMENT (OUTPATIENT)
Age: 38
End: 2023-02-28

## 2023-03-08 ENCOUNTER — EMERGENCY (EMERGENCY)
Facility: HOSPITAL | Age: 38
LOS: 1 days | Discharge: HOME | End: 2023-03-08
Attending: STUDENT IN AN ORGANIZED HEALTH CARE EDUCATION/TRAINING PROGRAM
Payer: COMMERCIAL

## 2023-03-08 VITALS
DIASTOLIC BLOOD PRESSURE: 87 MMHG | HEIGHT: 68 IN | HEART RATE: 86 BPM | OXYGEN SATURATION: 100 % | RESPIRATION RATE: 86 BRPM | WEIGHT: 240.08 LBS | TEMPERATURE: 99 F | SYSTOLIC BLOOD PRESSURE: 125 MMHG

## 2023-03-08 VITALS
HEART RATE: 73 BPM | TEMPERATURE: 98 F | OXYGEN SATURATION: 100 % | DIASTOLIC BLOOD PRESSURE: 83 MMHG | SYSTOLIC BLOOD PRESSURE: 125 MMHG | RESPIRATION RATE: 20 BRPM

## 2023-03-08 DIAGNOSIS — Z90.49 ACQUIRED ABSENCE OF OTHER SPECIFIED PARTS OF DIGESTIVE TRACT: Chronic | ICD-10-CM

## 2023-03-08 DIAGNOSIS — Z98.890 OTHER SPECIFIED POSTPROCEDURAL STATES: Chronic | ICD-10-CM

## 2023-03-08 DIAGNOSIS — Z90.79 ACQUIRED ABSENCE OF OTHER GENITAL ORGAN(S): Chronic | ICD-10-CM

## 2023-03-08 LAB
ALBUMIN SERPL ELPH-MCNC: 4.3 G/DL — SIGNIFICANT CHANGE UP (ref 3.3–5.2)
ALP SERPL-CCNC: 96 U/L — SIGNIFICANT CHANGE UP (ref 40–120)
ALT FLD-CCNC: 19 U/L — SIGNIFICANT CHANGE UP
ANION GAP SERPL CALC-SCNC: 12 MMOL/L — SIGNIFICANT CHANGE UP (ref 5–17)
AST SERPL-CCNC: 16 U/L — SIGNIFICANT CHANGE UP
BASOPHILS # BLD AUTO: 0.06 K/UL — SIGNIFICANT CHANGE UP (ref 0–0.2)
BASOPHILS NFR BLD AUTO: 0.7 % — SIGNIFICANT CHANGE UP (ref 0–2)
BILIRUB SERPL-MCNC: 0.3 MG/DL — LOW (ref 0.4–2)
BUN SERPL-MCNC: 9.7 MG/DL — SIGNIFICANT CHANGE UP (ref 8–20)
CALCIUM SERPL-MCNC: 9.3 MG/DL — SIGNIFICANT CHANGE UP (ref 8.4–10.5)
CHLORIDE SERPL-SCNC: 101 MMOL/L — SIGNIFICANT CHANGE UP (ref 96–108)
CO2 SERPL-SCNC: 23 MMOL/L — SIGNIFICANT CHANGE UP (ref 22–29)
CREAT SERPL-MCNC: 0.64 MG/DL — SIGNIFICANT CHANGE UP (ref 0.5–1.3)
D DIMER BLD IA.RAPID-MCNC: <150 NG/ML DDU — SIGNIFICANT CHANGE UP
EGFR: 118 ML/MIN/1.73M2 — SIGNIFICANT CHANGE UP
EOSINOPHIL # BLD AUTO: 0.12 K/UL — SIGNIFICANT CHANGE UP (ref 0–0.5)
EOSINOPHIL NFR BLD AUTO: 1.5 % — SIGNIFICANT CHANGE UP (ref 0–6)
GLUCOSE SERPL-MCNC: 98 MG/DL — SIGNIFICANT CHANGE UP (ref 70–99)
HCG SERPL-ACNC: <4 MIU/ML — SIGNIFICANT CHANGE UP
HCT VFR BLD CALC: 43.4 % — SIGNIFICANT CHANGE UP (ref 34.5–45)
HGB BLD-MCNC: 14.2 G/DL — SIGNIFICANT CHANGE UP (ref 11.5–15.5)
IMM GRANULOCYTES NFR BLD AUTO: 0.2 % — SIGNIFICANT CHANGE UP (ref 0–0.9)
LYMPHOCYTES # BLD AUTO: 2.37 K/UL — SIGNIFICANT CHANGE UP (ref 1–3.3)
LYMPHOCYTES # BLD AUTO: 28.7 % — SIGNIFICANT CHANGE UP (ref 13–44)
MAGNESIUM SERPL-MCNC: 2.1 MG/DL — SIGNIFICANT CHANGE UP (ref 1.8–2.6)
MCHC RBC-ENTMCNC: 27.6 PG — SIGNIFICANT CHANGE UP (ref 27–34)
MCHC RBC-ENTMCNC: 32.7 GM/DL — SIGNIFICANT CHANGE UP (ref 32–36)
MCV RBC AUTO: 84.3 FL — SIGNIFICANT CHANGE UP (ref 80–100)
MONOCYTES # BLD AUTO: 0.75 K/UL — SIGNIFICANT CHANGE UP (ref 0–0.9)
MONOCYTES NFR BLD AUTO: 9.1 % — SIGNIFICANT CHANGE UP (ref 2–14)
NEUTROPHILS # BLD AUTO: 4.95 K/UL — SIGNIFICANT CHANGE UP (ref 1.8–7.4)
NEUTROPHILS NFR BLD AUTO: 59.8 % — SIGNIFICANT CHANGE UP (ref 43–77)
PLATELET # BLD AUTO: 427 K/UL — HIGH (ref 150–400)
POTASSIUM SERPL-MCNC: 4 MMOL/L — SIGNIFICANT CHANGE UP (ref 3.5–5.3)
POTASSIUM SERPL-SCNC: 4 MMOL/L — SIGNIFICANT CHANGE UP (ref 3.5–5.3)
PROT SERPL-MCNC: 7.7 G/DL — SIGNIFICANT CHANGE UP (ref 6.6–8.7)
RBC # BLD: 5.15 M/UL — SIGNIFICANT CHANGE UP (ref 3.8–5.2)
RBC # FLD: 13.3 % — SIGNIFICANT CHANGE UP (ref 10.3–14.5)
SODIUM SERPL-SCNC: 136 MMOL/L — SIGNIFICANT CHANGE UP (ref 135–145)
T3 SERPL-MCNC: 108 NG/DL — SIGNIFICANT CHANGE UP (ref 80–200)
T4 AB SER-ACNC: 4.9 UG/DL — SIGNIFICANT CHANGE UP (ref 4.5–12)
TSH SERPL-MCNC: 2.3 UIU/ML — SIGNIFICANT CHANGE UP (ref 0.27–4.2)
WBC # BLD: 8.27 K/UL — SIGNIFICANT CHANGE UP (ref 3.8–10.5)
WBC # FLD AUTO: 8.27 K/UL — SIGNIFICANT CHANGE UP (ref 3.8–10.5)

## 2023-03-08 PROCEDURE — 71046 X-RAY EXAM CHEST 2 VIEWS: CPT

## 2023-03-08 PROCEDURE — 71275 CT ANGIOGRAPHY CHEST: CPT | Mod: MA

## 2023-03-08 PROCEDURE — 74174 CTA ABD&PLVS W/CONTRAST: CPT | Mod: 26,MA

## 2023-03-08 PROCEDURE — 85379 FIBRIN DEGRADATION QUANT: CPT

## 2023-03-08 PROCEDURE — 83735 ASSAY OF MAGNESIUM: CPT

## 2023-03-08 PROCEDURE — 99284 EMERGENCY DEPT VISIT MOD MDM: CPT

## 2023-03-08 PROCEDURE — 71046 X-RAY EXAM CHEST 2 VIEWS: CPT | Mod: 26

## 2023-03-08 PROCEDURE — 84436 ASSAY OF TOTAL THYROXINE: CPT

## 2023-03-08 PROCEDURE — 74174 CTA ABD&PLVS W/CONTRAST: CPT | Mod: MA

## 2023-03-08 PROCEDURE — 71275 CT ANGIOGRAPHY CHEST: CPT | Mod: 26,MA

## 2023-03-08 PROCEDURE — 84702 CHORIONIC GONADOTROPIN TEST: CPT

## 2023-03-08 PROCEDURE — 84443 ASSAY THYROID STIM HORMONE: CPT

## 2023-03-08 PROCEDURE — 36415 COLL VENOUS BLD VENIPUNCTURE: CPT

## 2023-03-08 PROCEDURE — 85025 COMPLETE CBC W/AUTO DIFF WBC: CPT

## 2023-03-08 PROCEDURE — 80053 COMPREHEN METABOLIC PANEL: CPT

## 2023-03-08 PROCEDURE — 84480 ASSAY TRIIODOTHYRONINE (T3): CPT

## 2023-03-08 NOTE — ED ADULT NURSE NOTE - NS PRO PASSIVE SMOKE EXP
Unknown Kin gas card, green bracelet, Eye shield with instructions , sunglasses and eye kit given to patient./other (specify)

## 2023-03-08 NOTE — ED PROVIDER NOTE - OBJECTIVE STATEMENT
37-year-old female no significant past medical history presents emergency department complaining of sudden onset of pain to the right upper back area that radiated to the right side of her neck and right side of her face.  Patient reports the symptoms started suddenly while typing on the computer.  Patient reports she did experience some tingling in the face and numbness in the right arm at the time of the symptoms.  She denies any radiation of pain to the chest.  Denies any shortness of breath, palpitations, syncope, nausea, vomiting, abdominal pain, fever, or chills.

## 2023-03-08 NOTE — ED PROVIDER NOTE - CLINICAL SUMMARY MEDICAL DECISION MAKING FREE TEXT BOX
37-year-old female presents to the emergency department sudden onset of right upper back pain radiating to the neck head and right arm.  Aortic dissection was considered due to the sudden onset of pain and radiation of pain with reported numbness as well.  CT angio of the chest abdomen pelvis was obtained which shows no dissection.  Patient has been pain-free in the emergency department with no further symptoms.  Results were discussed with the patient.  Will discharge with primary care follow-up.  ED return precautions were discussed at length.

## 2023-03-08 NOTE — ED ADULT TRIAGE NOTE - BMI (KG/M2)
Subjective:      Maren Hood is a 20 y.o. female here with patient. Patient brought in for Urinary Tract Infection      History of Present Illness:  HPI    Seen at urgent care [facility] and dx with UTI, no ucx done as far as she is aware.    prescribed macrodantin and pyridium but she cant take the macrodantin because she feels like she is going to throw up. Making her nauseated and headache decreased appetite. Took 3 doses of macrobid.tried 2 Sunday then once Monday but none since then because she is having trouble swallowing the pill, says it is too big. Nausea went away once she stopped taking abx    No fever, does have strong odor in urine then blood as well. No dysuria, just feels pressure in her bladder. Having urgency and frequency    Has been drinking more water this week.     Has been having some vaginal discharge this week as well, only in her urine. Not when she wipes    Last sex Saturday, 1 male partner  Sees OB and on nexplanon also on OCPs  Not using condoms      Review of Systems   Constitutional: Negative for appetite change and fever.   HENT: Negative for congestion, ear discharge, ear pain, mouth sores and sore throat.    Eyes: Negative for discharge and itching.   Respiratory: Negative for cough, shortness of breath and wheezing.    Gastrointestinal: Negative for abdominal distention, abdominal pain, constipation, diarrhea, nausea and vomiting.   Endocrine: Negative for polyuria.   Genitourinary: Positive for frequency, hematuria, urgency and vaginal discharge. Negative for decreased urine volume, dysuria and enuresis.   Musculoskeletal: Negative for gait problem.   Skin: Negative for rash.   Neurological: Negative for weakness and headaches.   Psychiatric/Behavioral: Negative for behavioral problems and sleep disturbance.       Objective:     Physical Exam   Constitutional: She appears well-developed and well-nourished.   HENT:   Right Ear: External ear normal.   Left Ear: External  ear normal.   Mouth/Throat: Oropharynx is clear and moist.   Eyes: Pupils are equal, round, and reactive to light. EOM are normal.   Neck: Normal range of motion.   Cardiovascular: Normal rate, regular rhythm and normal heart sounds.   No murmur heard.  Pulmonary/Chest: Effort normal and breath sounds normal. No respiratory distress. She has no wheezes.   Abdominal: Bowel sounds are normal.   Suprapubic tendereness   Neurological: She is alert. She exhibits normal muscle tone.   Skin: Skin is warm and dry. No rash noted.   Vitals reviewed.      Assessment:        1. Acute cystitis without hematuria    2. Vaginal discharge    3. Nausea         Plan:     Maren was seen today for urinary tract infection.    Diagnoses and all orders for this visit:    Acute cystitis without hematuria  -     POCT urine dipstick without microscope  -     Urine culture  -     Urinalysis Microscopic  -     cefdinir (OMNICEF) 250 mg/5 mL suspension; Take 6 mLs (300 mg total) by mouth 2 (two) times daily. for 10 days    Vaginal discharge  -     C. trachomatis/N. gonorrhoeae by AMP DNA  -     VAGINOSIS SCREEN BY DNA PROBE    Nausea  -     POCT Urine Pregnancy    Other orders  -     cefTRIAXone injection 1 g  -     lidocaine HCL 10 mg/ml (1%) injection 2.1 mL    UPT negative  UA +LE, + nitrite trace protein, neg blood  Sending for culture will follow    Refusing rocephin after initially agreed in clinic, also requesting something smaller, liquid so she can swallow better.   Sent omnicef, reviewed we may have to change abx based on ucx and if GC/chlamydia neg will need to return for rocephin, she is aware and agrees  Also discussed transition to adult medicine.    36.5

## 2023-03-08 NOTE — ED ADULT TRIAGE NOTE - CHIEF COMPLAINT QUOTE
Pt BIBA c/o sudden onset right sided lower back pain while sitting at her desk.  Pain radiates up to neck and down right arm.  Denies hx of back pain.  HURTADO.  Sensation intact.

## 2023-03-08 NOTE — ED ADULT NURSE NOTE - OBJECTIVE STATEMENT
Pt A&O x 4 in supertrack c/o upperback pain radiating to back of head and to R side of face. Pt states pain has subsided but c/o R sided facial numbness. GCS 15. Strength WNL. Sensory intact. IV access obtained; specimens sent to lab.

## 2023-03-08 NOTE — ED PROVIDER NOTE - ATTENDING APP SHARED VISIT CONTRIBUTION OF CARE
37-year-old female no significant past medical history presents emergency department complaining of sudden onset of pain to the right upper back area that radiated to the right side of her neck. No radiation to chest. Tingling in right arm.   AP - well appearing. nonfocal neuro exam. will get labs, ekg. CT r/o dissection. reassess

## 2023-03-08 NOTE — ED PROVIDER NOTE - NS ED ATTENDING STATEMENT MOD
This was a shared visit with the MARIA L. I reviewed and verified the documentation and independently performed the documented:

## 2023-03-08 NOTE — ED PROVIDER NOTE - PATIENT PORTAL LINK FT
You can access the FollowMyHealth Patient Portal offered by [facility] by registering at the following website: [email or web address]. By joining [facility]’s FollowMyHealth portal, you will also be able to view your health information using other applications (apps) compatible with our system.

## 2023-03-08 NOTE — ED ADULT TRIAGE NOTE - HEIGHT IN FEET
Obs goals:      -diagnostic tests and consults completed and resulted: labs ordered for morning.  OT consult deferred. Wound care for open area on RLE and R gluteal fold non-blanchable erythema completed. No interventions needed.  -vital signs normal or at patient baseline: Tachycardic 100-110, sat'ing well on home 02. Hgb stable at 8.7. Normotensive, afebrile  -tolerating oral antibiotics or has plans for home infusion setup: on oral doxycycline  -dyspnea improved and O2 sats greater than 88% on room air or prior home oxygen levels: sat'ing mid-90's with home 02. Denies SOB  -safe disposition plan has been identified: Care coordinator to see in a.m.  Currently resides at [facility].  Plan to dc back to [facility] at 1300.        Nurse to notify provider when observation goals have been met and patient is ready for discharge.   5

## 2023-03-08 NOTE — ED PROVIDER NOTE - NSICDXPASTSURGICALHX_GEN_ALL_CORE_FT
PAST SURGICAL HISTORY:  H/O bilateral salpingectomy     H/O ovarian cystectomy     History of cholecystectomy     History of rotator cuff surgery

## 2023-03-14 ENCOUNTER — APPOINTMENT (OUTPATIENT)
Dept: OBGYN | Facility: CLINIC | Age: 38
End: 2023-03-14

## 2023-07-19 NOTE — DISCHARGE NOTE OB - MEDICATION SUMMARY - MEDICATIONS TO TAKE
I will START or STAY ON the medications listed below when I get home from the hospital:    acetaminophen 325 mg oral tablet  -- 3 tab(s) by mouth   -- Indication: For for pain    ibuprofen 600 mg oral tablet  -- 1 tab(s) by mouth every 6 hours  -- Indication: For for pain
Alert and oriented to person, place and time

## 2023-11-26 ENCOUNTER — NON-APPOINTMENT (OUTPATIENT)
Age: 38
End: 2023-11-26

## 2025-06-26 NOTE — ED ADULT NURSE REASSESSMENT NOTE - GASTROINTESTINAL ASSESSMENT
Received: Today  Yumiko Abreu [facility] Nurse Msg Javed; MIKEY Godinez Ma  Xifaxan 550MG tablets  -  Approved - Ready to fill     Authorization Number: [id number]  Valid from 06/26/2025 to 07/10/2025     Script has been released to [facility]     Prescription Insurance: [employer]  Type of Coverage: Medicare Part D  Patient's Co-Pay: $946.49     Co pay Assistance Information                   This patient is APPROVED for the [facility] and patient will receive the medication from [facility] at no charge to them.       The following items have been verified:     Insurance is Medicare Part-D  Patient's Federal Poverty Level (FPL) 198%   Snip of FPL from Registration tab required  Date FPL check  was completed 01/16/2025  Date patient last saw an [facility] provider 06/24/2025  Medication is available at [facility]:  Yes  Medication is on the [facility] Drug list: Yes Snip of medication from list required  Insurance allows [facility] to fill:  Yes   Snip of test claim required  No [facility] are open as of 6/26/25   Snip of closed grants required  Email sent to [email or web address]: Yes     When is patient due for their next fill? New Start     Additional information:  Spoke with patient, they are aware of approval and filling pharmacy.     Yumiko Abreu  6/26/25  
WDL